# Patient Record
Sex: MALE | Race: WHITE | NOT HISPANIC OR LATINO | Employment: OTHER | URBAN - METROPOLITAN AREA
[De-identification: names, ages, dates, MRNs, and addresses within clinical notes are randomized per-mention and may not be internally consistent; named-entity substitution may affect disease eponyms.]

---

## 2018-02-27 RX ORDER — SIMVASTATIN 40 MG
40 TABLET ORAL
COMMUNITY
End: 2018-05-23 | Stop reason: CLARIF

## 2018-02-27 RX ORDER — METOPROLOL TARTRATE 50 MG/1
75 TABLET, FILM COATED ORAL EVERY 12 HOURS SCHEDULED
COMMUNITY

## 2018-02-27 RX ORDER — ASPIRIN 81 MG/1
81 TABLET ORAL DAILY
COMMUNITY

## 2018-02-27 RX ORDER — OMEGA-3 FATTY ACIDS CAP DELAYED RELEASE 1000 MG 1000 MG
1 CAPSULE DELAYED RELEASE ORAL DAILY
COMMUNITY

## 2018-02-27 RX ORDER — QUINAPRIL 20 MG/1
20 TABLET ORAL DAILY
COMMUNITY

## 2018-02-27 NOTE — PRE-PROCEDURE INSTRUCTIONS
Pre-Surgery Instructions:   Medication Instructions    AMLODIPINE BESYLATE PO Instructed patient per Anesthesia Guidelines   aspirin (ECOTRIN LOW STRENGTH) 81 mg EC tablet Instructed patient per Anesthesia Guidelines   Calcium Carbonate-Vitamin D (CALCIUM 600+D PO) Instructed patient per Anesthesia Guidelines   metoprolol tartrate (LOPRESSOR) 50 mg tablet Instructed patient per Anesthesia Guidelines   Multiple Vitamins-Iron (MULTIVITAMIN/IRON PO) Instructed patient per Anesthesia Guidelines   Omega-3 Fatty Acids (FISH OIL) 1000 MG CPDR Instructed patient per Anesthesia Guidelines   quinapril (ACCUPRIL) 20 mg tablet Instructed patient per Anesthesia Guidelines   simvastatin (ZOCOR) 40 mg tablet Instructed patient per Anesthesia Guidelines     I  Pre op instructions reviewed; verbalized understanding

## 2018-03-02 ENCOUNTER — ANESTHESIA (OUTPATIENT)
Dept: PERIOP | Facility: HOSPITAL | Age: 73
End: 2018-03-02
Payer: MEDICARE

## 2018-03-02 ENCOUNTER — HOSPITAL ENCOUNTER (OUTPATIENT)
Facility: HOSPITAL | Age: 73
Setting detail: OUTPATIENT SURGERY
Discharge: HOME | End: 2018-03-02
Attending: SURGERY | Admitting: SURGERY
Payer: MEDICARE

## 2018-03-02 ENCOUNTER — ANESTHESIA EVENT (OUTPATIENT)
Dept: PERIOP | Facility: HOSPITAL | Age: 73
End: 2018-03-02
Payer: MEDICARE

## 2018-03-02 VITALS
HEIGHT: 69 IN | OXYGEN SATURATION: 94 % | TEMPERATURE: 98.4 F | SYSTOLIC BLOOD PRESSURE: 122 MMHG | WEIGHT: 195 LBS | RESPIRATION RATE: 18 BRPM | DIASTOLIC BLOOD PRESSURE: 74 MMHG | HEART RATE: 71 BPM | BODY MASS INDEX: 28.88 KG/M2

## 2018-03-02 PROCEDURE — C1781 MESH (IMPLANTABLE): HCPCS | Performed by: SURGERY

## 2018-03-02 DEVICE — BARD MODIFIED KUGEL HERNIA PATCH
Type: IMPLANTABLE DEVICE | Site: ABDOMEN | Status: FUNCTIONAL
Brand: BARD MODIFIED KUGEL HERNIA PATCH

## 2018-03-02 RX ORDER — ALBUMIN, HUMAN INJ 5% 5 %
SOLUTION INTRAVENOUS CONTINUOUS PRN
Status: DISCONTINUED | OUTPATIENT
Start: 2018-03-02 | End: 2018-03-02

## 2018-03-02 RX ORDER — FENTANYL CITRATE/PF 50 MCG/ML
25 SYRINGE (ML) INJECTION
Status: DISCONTINUED | OUTPATIENT
Start: 2018-03-02 | End: 2018-03-02 | Stop reason: HOSPADM

## 2018-03-02 RX ORDER — MIDAZOLAM HYDROCHLORIDE 1 MG/ML
INJECTION INTRAMUSCULAR; INTRAVENOUS AS NEEDED
Status: DISCONTINUED | OUTPATIENT
Start: 2018-03-02 | End: 2018-03-02 | Stop reason: SURG

## 2018-03-02 RX ORDER — CLINDAMYCIN PHOSPHATE 900 MG/50ML
900 INJECTION INTRAVENOUS ONCE
Status: DISCONTINUED | OUTPATIENT
Start: 2018-03-02 | End: 2018-03-02 | Stop reason: HOSPADM

## 2018-03-02 RX ORDER — OXYCODONE HYDROCHLORIDE AND ACETAMINOPHEN 5; 325 MG/1; MG/1
1 TABLET ORAL EVERY 4 HOURS PRN
Status: DISCONTINUED | OUTPATIENT
Start: 2018-03-02 | End: 2018-03-02 | Stop reason: HOSPADM

## 2018-03-02 RX ORDER — METOCLOPRAMIDE HYDROCHLORIDE 5 MG/ML
10 INJECTION INTRAMUSCULAR; INTRAVENOUS ONCE AS NEEDED
Status: DISCONTINUED | OUTPATIENT
Start: 2018-03-02 | End: 2018-03-02 | Stop reason: HOSPADM

## 2018-03-02 RX ORDER — ONDANSETRON 2 MG/ML
4 INJECTION INTRAMUSCULAR; INTRAVENOUS ONCE AS NEEDED
Status: DISCONTINUED | OUTPATIENT
Start: 2018-03-02 | End: 2018-03-02 | Stop reason: HOSPADM

## 2018-03-02 RX ORDER — GLYCOPYRROLATE 0.2 MG/ML
INJECTION INTRAMUSCULAR; INTRAVENOUS AS NEEDED
Status: DISCONTINUED | OUTPATIENT
Start: 2018-03-02 | End: 2018-03-02 | Stop reason: SURG

## 2018-03-02 RX ORDER — BUPIVACAINE HYDROCHLORIDE AND EPINEPHRINE 2.5; 5 MG/ML; UG/ML
INJECTION, SOLUTION EPIDURAL; INFILTRATION; INTRACAUDAL; PERINEURAL AS NEEDED
Status: DISCONTINUED | OUTPATIENT
Start: 2018-03-02 | End: 2018-03-02 | Stop reason: HOSPADM

## 2018-03-02 RX ORDER — SODIUM CHLORIDE, SODIUM LACTATE, POTASSIUM CHLORIDE, CALCIUM CHLORIDE 600; 310; 30; 20 MG/100ML; MG/100ML; MG/100ML; MG/100ML
20 INJECTION, SOLUTION INTRAVENOUS CONTINUOUS
Status: DISCONTINUED | OUTPATIENT
Start: 2018-03-02 | End: 2018-03-02 | Stop reason: HOSPADM

## 2018-03-02 RX ORDER — PROPOFOL 10 MG/ML
INJECTION, EMULSION INTRAVENOUS AS NEEDED
Status: DISCONTINUED | OUTPATIENT
Start: 2018-03-02 | End: 2018-03-02 | Stop reason: SURG

## 2018-03-02 RX ORDER — ONDANSETRON 2 MG/ML
4 INJECTION INTRAMUSCULAR; INTRAVENOUS EVERY 4 HOURS PRN
Status: DISCONTINUED | OUTPATIENT
Start: 2018-03-02 | End: 2018-03-02 | Stop reason: HOSPADM

## 2018-03-02 RX ORDER — FENTANYL CITRATE 50 UG/ML
INJECTION, SOLUTION INTRAMUSCULAR; INTRAVENOUS AS NEEDED
Status: DISCONTINUED | OUTPATIENT
Start: 2018-03-02 | End: 2018-03-02 | Stop reason: SURG

## 2018-03-02 RX ORDER — ONDANSETRON 2 MG/ML
INJECTION INTRAMUSCULAR; INTRAVENOUS AS NEEDED
Status: DISCONTINUED | OUTPATIENT
Start: 2018-03-02 | End: 2018-03-02 | Stop reason: SURG

## 2018-03-02 RX ORDER — LIDOCAINE HYDROCHLORIDE 10 MG/ML
INJECTION, SOLUTION INFILTRATION; PERINEURAL AS NEEDED
Status: DISCONTINUED | OUTPATIENT
Start: 2018-03-02 | End: 2018-03-02 | Stop reason: SURG

## 2018-03-02 RX ORDER — ROCURONIUM BROMIDE 10 MG/ML
INJECTION, SOLUTION INTRAVENOUS AS NEEDED
Status: DISCONTINUED | OUTPATIENT
Start: 2018-03-02 | End: 2018-03-02 | Stop reason: SURG

## 2018-03-02 RX ORDER — CLINDAMYCIN PHOSPHATE 150 MG/ML
INJECTION, SOLUTION INTRAVENOUS AS NEEDED
Status: DISCONTINUED | OUTPATIENT
Start: 2018-03-02 | End: 2018-03-02 | Stop reason: SURG

## 2018-03-02 RX ADMIN — PROPOFOL 160 MG: 10 INJECTION, EMULSION INTRAVENOUS at 10:15

## 2018-03-02 RX ADMIN — ROCURONIUM BROMIDE 50 MG: 10 INJECTION INTRAVENOUS at 10:15

## 2018-03-02 RX ADMIN — ONDANSETRON 4 MG: 2 INJECTION INTRAMUSCULAR; INTRAVENOUS at 10:22

## 2018-03-02 RX ADMIN — SODIUM CHLORIDE, SODIUM LACTATE, POTASSIUM CHLORIDE, AND CALCIUM CHLORIDE 20 ML/HR: .6; .31; .03; .02 INJECTION, SOLUTION INTRAVENOUS at 11:30

## 2018-03-02 RX ADMIN — SODIUM CHLORIDE, SODIUM LACTATE, POTASSIUM CHLORIDE, AND CALCIUM CHLORIDE: .6; .31; .03; .02 INJECTION, SOLUTION INTRAVENOUS at 08:37

## 2018-03-02 RX ADMIN — FENTANYL CITRATE 50 MCG: 50 INJECTION, SOLUTION INTRAMUSCULAR; INTRAVENOUS at 10:15

## 2018-03-02 RX ADMIN — LIDOCAINE HYDROCHLORIDE 100 MG: 10 INJECTION, SOLUTION INFILTRATION; PERINEURAL at 10:15

## 2018-03-02 RX ADMIN — CLINDAMYCIN PHOSPHATE 900 MG: 150 INJECTION, SOLUTION INTRAMUSCULAR; INTRAVENOUS at 10:15

## 2018-03-02 RX ADMIN — FENTANYL CITRATE 25 MCG: 50 INJECTION, SOLUTION INTRAMUSCULAR; INTRAVENOUS at 11:35

## 2018-03-02 RX ADMIN — FENTANYL CITRATE 25 MCG: 50 INJECTION, SOLUTION INTRAMUSCULAR; INTRAVENOUS at 11:28

## 2018-03-02 RX ADMIN — SODIUM CHLORIDE, SODIUM LACTATE, POTASSIUM CHLORIDE, AND CALCIUM CHLORIDE 20 ML/HR: .6; .31; .03; .02 INJECTION, SOLUTION INTRAVENOUS at 08:36

## 2018-03-02 RX ADMIN — FENTANYL CITRATE 25 MCG: 50 INJECTION, SOLUTION INTRAMUSCULAR; INTRAVENOUS at 11:23

## 2018-03-02 RX ADMIN — ONDANSETRON 4 MG: 2 INJECTION INTRAMUSCULAR; INTRAVENOUS at 10:55

## 2018-03-02 RX ADMIN — GLYCOPYRROLATE 0.8 MG: 0.2 INJECTION, SOLUTION INTRAMUSCULAR; INTRAVENOUS at 10:55

## 2018-03-02 RX ADMIN — NEOSTIGMINE METHYLSULFATE 5 MG: 1 INJECTION, SOLUTION INTRAMUSCULAR; INTRAVENOUS; SUBCUTANEOUS at 10:55

## 2018-03-02 RX ADMIN — FENTANYL CITRATE 50 MCG: 50 INJECTION, SOLUTION INTRAMUSCULAR; INTRAVENOUS at 11:02

## 2018-03-02 RX ADMIN — FENTANYL CITRATE 25 MCG: 50 INJECTION, SOLUTION INTRAMUSCULAR; INTRAVENOUS at 11:18

## 2018-03-02 RX ADMIN — MIDAZOLAM HYDROCHLORIDE 2 MG: 1 INJECTION, SOLUTION INTRAMUSCULAR; INTRAVENOUS at 10:05

## 2018-03-02 NOTE — DISCHARGE INSTRUCTIONS
Diet and activity as tolerated  May shower  May drive when not taking pain medication  Please consider milk of magnesia daily in order to help prevent constipation  Please call [phone] for a follow-up office visit for 2 weeks

## 2018-03-02 NOTE — ANESTHESIA POSTPROCEDURE EVALUATION
Post-Op Assessment Note      CV Status:  Stable    Mental Status:  Awake    Hydration Status:  Stable    PONV Controlled:  None    Airway Patency:  Patent    Post Op Vitals Reviewed: Yes          Staff: CRNA       Comments: Pt  to Pacu  Report given  Course uneventful  VSS  Airway patent            /88 (03/02/18 1112)    Temp      Pulse 85 (03/02/18 1112)   Resp 16 (03/02/18 1112)    SpO2 98 % (03/02/18 1112)

## 2018-03-02 NOTE — ANESTHESIA PREPROCEDURE EVALUATION
Review of Systems/Medical History  Patient summary reviewed  Chart reviewed      Cardiovascular  EKG reviewed, Hyperlipidemia, Hypertension , Past MI , History of CABG,    Pulmonary  Negative pulmonary ROS        GI/Hepatic      Comment: Hernia, hx colon cancer     Negative  ROS        Endo/Other  Negative endo/other ROS      GYN  Negative gynecology ROS          Hematology  Negative hematology ROS      Musculoskeletal  Negative musculoskeletal ROS        Neurology  Negative neurology ROS      Psychology   Negative psychology ROS              Physical Exam    Airway    Mallampati score: II  TM Distance: >3 FB  Neck ROM: full     Dental   No notable dental hx upper dentures and lower dentures,     Cardiovascular  Rhythm: regular, Rate: normal, Cardiovascular exam normal    Pulmonary  Pulmonary exam normal Breath sounds clear to auscultation,     Other Findings        Anesthesia Plan  ASA Score- 3     Anesthesia Type- general with ASA Monitors  Additional Monitors:   Airway Plan: ETT  Comment: I, Dr Lachelle Liriano, the attending physician, have personally seen and evaluated the patient prior to anesthetic care  I have reviewed the pre-anesthetic record, and other medical records if appropriate to the anesthetic care  If a CRNA is involved in the case, I have reviewed the CRNA assessment, if present, and agree  The patient is in a suitable condition to proceed with my formulated anesthetic plan        Plan Factors-    Induction- intravenous  Postoperative Plan-     Informed Consent- Anesthetic plan and risks discussed with patient  I personally reviewed this patient with the CRNA  Discussed and agreed on the Anesthesia Plan with the CRNA  Sesar Ibarra

## 2018-03-02 NOTE — OP NOTE
OPERATIVE REPORT  PATIENT NAME: Noelle Reason    :  1945  MRN: [id number]  Pt Location: [facility] OR ROOM 07    SURGERY DATE: 3/2/2018    Surgeon(s) and Role:     Staci Logan MD - Primary    Preop Diagnosis:  Ventral hernia [K43 9]    Post-Op Diagnosis Codes: * Ventral hernia [K43 9]    Procedure(s) (LRB):  REPAIR HERNIA VENTRAL WITH MESH (N/A)    Specimen(s):  * No specimens in log *    Estimated Blood Loss:   0 mL    Drains:       Anesthesia Type:   General    Operative Indications:  Ventral hernia [K43 9]      Operative Findings:  Independent, nonsmoker, ASA 2, wound class 1, BMI 29, weight 195, height 69 inch        Cardiovascular  EKG reviewed, Hyperlipidemia, Hypertension , Past MI , History of CABG,  Pulmonary  Negative pulmonary ROS       GI/Hepatic     Comment: Hernia, hx colon cancer      Negative  ROS       Endo/Other  Negative endo/other ROS     GYN  Negative gynecology ROS          Hematology  Negative hematology ROS     Musculoskeletal  Negative musculoskeletal ROS       Neurology  Negative neurology ROS     Psychology   Negative psychology ROS                 Complications:   None    Procedure and Technique:  Patient was identified visually and via armband  Placed in a supine position  After general anesthesia the abdomen was prepped and draped in a sterile fashion  0 25% Marcaine with epinephrine was utilized throughout the procedure  Incision was made the upper midline abdomen carried down through skin subcutaneous tissue  Incisional hernia was present  The hernia sac was dissected free and retracted  Omentum was interposed and this region  A large diastasis was also present  A 4 inch x 4 5 inch polypropylene mesh was then introduced  This was sewn in place with interrupted figure-of-eight 1  Vicryl suture  A by 2 0 Vicryl subcutaneous and 4 Monocryl sutures  Dermabond was applied    The patient was awake from anesthesia transferred to the recovery room stable condition  Sponge and instrument count correct     I was present for the entire procedure    Patient Disposition:  PACU     SIGNATURE: Marlene Grigsby MD  DATE: March 2, 2018  TIME: 11:55 AM

## 2018-04-19 ENCOUNTER — APPOINTMENT (EMERGENCY)
Dept: RADIOLOGY | Facility: HOSPITAL | Age: 73
DRG: 546 | End: 2018-04-19
Payer: MEDICARE

## 2018-04-19 ENCOUNTER — HOSPITAL ENCOUNTER (INPATIENT)
Facility: HOSPITAL | Age: 73
LOS: 1 days | Discharge: HOME | DRG: 546 | End: 2018-04-21
Attending: EMERGENCY MEDICINE | Admitting: INTERNAL MEDICINE
Payer: MEDICARE

## 2018-04-19 DIAGNOSIS — R77.8 ELEVATED TROPONIN: ICD-10-CM

## 2018-04-19 DIAGNOSIS — R42 DIZZINESS: ICD-10-CM

## 2018-04-19 DIAGNOSIS — R07.9 CHEST PAIN: Primary | ICD-10-CM

## 2018-04-19 PROBLEM — Z95.1 HX OF CABG: Status: ACTIVE | Noted: 2018-04-19

## 2018-04-19 PROBLEM — E78.5 HLD (HYPERLIPIDEMIA): Status: ACTIVE | Noted: 2018-04-19

## 2018-04-19 PROBLEM — H81.13 BENIGN PAROXYSMAL POSITIONAL VERTIGO DUE TO BILATERAL VESTIBULAR DISORDER: Status: ACTIVE | Noted: 2018-04-19

## 2018-04-19 PROBLEM — R07.2 PRECORDIAL PAIN: Status: ACTIVE | Noted: 2018-04-19

## 2018-04-19 PROBLEM — I10 ESSENTIAL HYPERTENSION: Status: ACTIVE | Noted: 2018-04-19

## 2018-04-19 LAB
ALBUMIN SERPL BCP-MCNC: 3.5 G/DL (ref 3.5–5)
ALP SERPL-CCNC: 66 U/L (ref 46–116)
ALT SERPL W P-5'-P-CCNC: 49 U/L (ref 12–78)
ANION GAP SERPL CALCULATED.3IONS-SCNC: 8 MMOL/L (ref 4–13)
AST SERPL W P-5'-P-CCNC: 59 U/L (ref 5–45)
ATRIAL RATE: 192 BPM
ATRIAL RATE: 66 BPM
ATRIAL RATE: 69 BPM
BASOPHILS # BLD AUTO: 0.02 THOUSANDS/ΜL (ref 0–0.1)
BASOPHILS NFR BLD AUTO: 0 % (ref 0–1)
BILIRUB SERPL-MCNC: 0.84 MG/DL (ref 0.2–1)
BUN SERPL-MCNC: 9 MG/DL (ref 5–25)
CALCIUM SERPL-MCNC: 9 MG/DL (ref 8.3–10.1)
CHLORIDE SERPL-SCNC: 107 MMOL/L (ref 100–108)
CO2 SERPL-SCNC: 24 MMOL/L (ref 21–32)
CREAT SERPL-MCNC: 0.84 MG/DL (ref 0.6–1.3)
EOSINOPHIL # BLD AUTO: 0.07 THOUSAND/ΜL (ref 0–0.61)
EOSINOPHIL NFR BLD AUTO: 2 % (ref 0–6)
ERYTHROCYTE [DISTWIDTH] IN BLOOD BY AUTOMATED COUNT: 13.5 % (ref 11.6–15.1)
GFR SERPL CREATININE-BSD FRML MDRD: 88 ML/MIN/1.73SQ M
GLUCOSE SERPL-MCNC: 159 MG/DL (ref 65–140)
HCT VFR BLD AUTO: 42 % (ref 36.5–49.3)
HGB BLD-MCNC: 14.3 G/DL (ref 12–17)
LYMPHOCYTES # BLD AUTO: 1.23 THOUSANDS/ΜL (ref 0.6–4.47)
LYMPHOCYTES NFR BLD AUTO: 27 % (ref 14–44)
MAGNESIUM SERPL-MCNC: 2.1 MG/DL (ref 1.6–2.6)
MCH RBC QN AUTO: 35.2 PG (ref 26.8–34.3)
MCHC RBC AUTO-ENTMCNC: 34 G/DL (ref 31.4–37.4)
MCV RBC AUTO: 103 FL (ref 82–98)
MONOCYTES # BLD AUTO: 0.45 THOUSAND/ΜL (ref 0.17–1.22)
MONOCYTES NFR BLD AUTO: 10 % (ref 4–12)
NEUTROPHILS # BLD AUTO: 2.83 THOUSANDS/ΜL (ref 1.85–7.62)
NEUTS SEG NFR BLD AUTO: 61 % (ref 43–75)
NRBC BLD AUTO-RTO: 0 /100 WBCS
NT-PROBNP SERPL-MCNC: 155 PG/ML
P AXIS: 52 DEGREES
P AXIS: 54 DEGREES
PLATELET # BLD AUTO: 152 THOUSANDS/UL (ref 149–390)
PLATELET # BLD AUTO: 172 THOUSANDS/UL (ref 149–390)
PMV BLD AUTO: 10.1 FL (ref 8.9–12.7)
PMV BLD AUTO: 9.7 FL (ref 8.9–12.7)
POTASSIUM SERPL-SCNC: 5 MMOL/L (ref 3.5–5.3)
PR INTERVAL: 174 MS
PR INTERVAL: 194 MS
PROT SERPL-MCNC: 7.9 G/DL (ref 6.4–8.2)
QRS AXIS: -57 DEGREES
QRS AXIS: -64 DEGREES
QRS AXIS: -65 DEGREES
QRSD INTERVAL: 120 MS
QRSD INTERVAL: 126 MS
QRSD INTERVAL: 142 MS
QT INTERVAL: 442 MS
QT INTERVAL: 442 MS
QT INTERVAL: 462 MS
QTC INTERVAL: 463 MS
QTC INTERVAL: 473 MS
QTC INTERVAL: 484 MS
RBC # BLD AUTO: 4.06 MILLION/UL (ref 3.88–5.62)
SODIUM SERPL-SCNC: 139 MMOL/L (ref 136–145)
T WAVE AXIS: -5 DEGREES
T WAVE AXIS: 19 DEGREES
T WAVE AXIS: 4 DEGREES
TROPONIN I SERPL-MCNC: 0.32 NG/ML
TROPONIN I SERPL-MCNC: 0.33 NG/ML
TROPONIN I SERPL-MCNC: <0.02 NG/ML
VENTRICULAR RATE: 66 BPM
VENTRICULAR RATE: 66 BPM
VENTRICULAR RATE: 69 BPM
WBC # BLD AUTO: 4.61 THOUSAND/UL (ref 4.31–10.16)

## 2018-04-19 PROCEDURE — 85025 COMPLETE CBC W/AUTO DIFF WBC: CPT

## 2018-04-19 PROCEDURE — 80053 COMPREHEN METABOLIC PANEL: CPT

## 2018-04-19 PROCEDURE — 71046 X-RAY EXAM CHEST 2 VIEWS: CPT

## 2018-04-19 PROCEDURE — 36415 COLL VENOUS BLD VENIPUNCTURE: CPT

## 2018-04-19 PROCEDURE — 93010 ELECTROCARDIOGRAM REPORT: CPT | Performed by: INTERNAL MEDICINE

## 2018-04-19 PROCEDURE — 96360 HYDRATION IV INFUSION INIT: CPT

## 2018-04-19 PROCEDURE — 99285 EMERGENCY DEPT VISIT HI MDM: CPT

## 2018-04-19 PROCEDURE — 70450 CT HEAD/BRAIN W/O DYE: CPT

## 2018-04-19 PROCEDURE — 85049 AUTOMATED PLATELET COUNT: CPT | Performed by: INTERNAL MEDICINE

## 2018-04-19 PROCEDURE — 84484 ASSAY OF TROPONIN QUANT: CPT

## 2018-04-19 PROCEDURE — 83735 ASSAY OF MAGNESIUM: CPT | Performed by: PHYSICIAN ASSISTANT

## 2018-04-19 PROCEDURE — 83880 ASSAY OF NATRIURETIC PEPTIDE: CPT | Performed by: PHYSICIAN ASSISTANT

## 2018-04-19 PROCEDURE — 84484 ASSAY OF TROPONIN QUANT: CPT | Performed by: INTERNAL MEDICINE

## 2018-04-19 PROCEDURE — 93005 ELECTROCARDIOGRAM TRACING: CPT

## 2018-04-19 PROCEDURE — 99220 PR INITIAL OBSERVATION CARE/DAY 70 MINUTES: CPT | Performed by: INTERNAL MEDICINE

## 2018-04-19 RX ORDER — ONDANSETRON 2 MG/ML
4 INJECTION INTRAMUSCULAR; INTRAVENOUS EVERY 6 HOURS PRN
Status: DISCONTINUED | OUTPATIENT
Start: 2018-04-19 | End: 2018-04-21 | Stop reason: HOSPADM

## 2018-04-19 RX ORDER — ACETAMINOPHEN 325 MG/1
650 TABLET ORAL EVERY 4 HOURS PRN
Status: DISCONTINUED | OUTPATIENT
Start: 2018-04-19 | End: 2018-04-21 | Stop reason: HOSPADM

## 2018-04-19 RX ORDER — MECLIZINE HYDROCHLORIDE 25 MG/1
25 TABLET ORAL EVERY 8 HOURS SCHEDULED
Status: DISCONTINUED | OUTPATIENT
Start: 2018-04-19 | End: 2018-04-21 | Stop reason: HOSPADM

## 2018-04-19 RX ORDER — AMLODIPINE BESYLATE 5 MG/1
5 TABLET ORAL DAILY
Status: DISCONTINUED | OUTPATIENT
Start: 2018-04-20 | End: 2018-04-21 | Stop reason: HOSPADM

## 2018-04-19 RX ORDER — DIAZEPAM 5 MG/ML
2 INJECTION, SOLUTION INTRAMUSCULAR; INTRAVENOUS ONCE
Status: COMPLETED | OUTPATIENT
Start: 2018-04-19 | End: 2018-04-19

## 2018-04-19 RX ORDER — DIAZEPAM 5 MG/ML
5 INJECTION, SOLUTION INTRAMUSCULAR; INTRAVENOUS
Status: DISCONTINUED | OUTPATIENT
Start: 2018-04-19 | End: 2018-04-21 | Stop reason: HOSPADM

## 2018-04-19 RX ORDER — PRAVASTATIN SODIUM 40 MG
40 TABLET ORAL
Status: DISCONTINUED | OUTPATIENT
Start: 2018-04-19 | End: 2018-04-21 | Stop reason: HOSPADM

## 2018-04-19 RX ORDER — NITROGLYCERIN 0.4 MG/1
0.4 TABLET SUBLINGUAL ONCE
Status: DISCONTINUED | OUTPATIENT
Start: 2018-04-19 | End: 2018-04-19

## 2018-04-19 RX ORDER — NITROGLYCERIN 0.4 MG/1
0.4 TABLET SUBLINGUAL ONCE
Status: COMPLETED | OUTPATIENT
Start: 2018-04-19 | End: 2018-04-19

## 2018-04-19 RX ORDER — ASPIRIN 81 MG/1
81 TABLET ORAL DAILY
Status: DISCONTINUED | OUTPATIENT
Start: 2018-04-20 | End: 2018-04-21 | Stop reason: HOSPADM

## 2018-04-19 RX ORDER — MECLIZINE HYDROCHLORIDE 25 MG/1
25 TABLET ORAL ONCE
Status: COMPLETED | OUTPATIENT
Start: 2018-04-19 | End: 2018-04-19

## 2018-04-19 RX ORDER — MAGNESIUM HYDROXIDE/ALUMINUM HYDROXICE/SIMETHICONE 120; 1200; 1200 MG/30ML; MG/30ML; MG/30ML
30 SUSPENSION ORAL EVERY 6 HOURS PRN
Status: DISCONTINUED | OUTPATIENT
Start: 2018-04-19 | End: 2018-04-21 | Stop reason: HOSPADM

## 2018-04-19 RX ORDER — QUINAPRIL 20 MG/1
20 TABLET ORAL DAILY
Status: DISCONTINUED | OUTPATIENT
Start: 2018-04-20 | End: 2018-04-21 | Stop reason: HOSPADM

## 2018-04-19 RX ADMIN — PRAVASTATIN SODIUM 40 MG: 40 TABLET ORAL at 18:35

## 2018-04-19 RX ADMIN — NITROGLYCERIN 0.4 MG: 0.4 TABLET SUBLINGUAL at 07:54

## 2018-04-19 RX ADMIN — MECLIZINE HYDROCHLORIDE 25 MG: 25 TABLET ORAL at 14:11

## 2018-04-19 RX ADMIN — MECLIZINE HYDROCHLORIDE 25 MG: 25 TABLET ORAL at 07:46

## 2018-04-19 RX ADMIN — SODIUM CHLORIDE 1000 ML: 0.9 INJECTION, SOLUTION INTRAVENOUS at 07:22

## 2018-04-19 RX ADMIN — MECLIZINE HYDROCHLORIDE 25 MG: 25 TABLET ORAL at 21:15

## 2018-04-19 RX ADMIN — METOPROLOL TARTRATE 75 MG: 50 TABLET ORAL at 21:16

## 2018-04-19 RX ADMIN — DIAZEPAM 2 MG: 5 INJECTION, SOLUTION INTRAMUSCULAR; INTRAVENOUS at 10:15

## 2018-04-19 RX ADMIN — NITROGLYCERIN 1 INCH: 20 OINTMENT TOPICAL at 08:04

## 2018-04-19 RX ADMIN — DIAZEPAM 5 MG: 5 INJECTION, SOLUTION INTRAMUSCULAR; INTRAVENOUS at 21:15

## 2018-04-19 NOTE — H&P
H&P- Amor Zavala 1945, 72 y o  male MRN: [id number]    Unit/Bed#: ED 19 Encounter: [id number]    Primary Care Provider: Wells Sacks, DO   Date and time admitted to hospital: 4/19/2018  6:58 AM        * Benign paroxysmal positional vertigo due to bilateral vestibular disorder   Assessment & Plan    Likely BPPV- given history of vascular disease/chronic lacunar infarctions rule out posterior circulation stroke    Supportive care  Check MRI brain   Meclizine  Hydration  Outpatient vestibular therapy  Continue aspirin/statin        Precordial pain   Assessment & Plan    Currently resolved    Reports that he had stress test in November 2017-was told it was normal  Follows with cardiologist at [facility]  Trend troponins x3  Check fasting lipids        Essential hypertension   Assessment & Plan    Resume outpatient medications and monitor        Hx of CABG   Assessment & Plan    History of CABG 2000  Outpatient Cardiology follow-up        HLD (hyperlipidemia)   Assessment & Plan    Continue statin and check fasting lipids          VTE Prophylaxis: Enoxaparin (Lovenox)  / sequential compression device   Code Status:  Full code  POLST: There is no POLST form on file for this patient (pre-hospital)  Discussion with family:     Anticipated Length of Stay:  Patient will be admitted on an Observation basis with an anticipated length of stay of  < 2 midnights  Justification for Hospital Stay:  Vertigo    Total Time for Visit, including Counseling / Coordination of Care: 70 minutes  Greater than 50% of this total time spent on direct patient counseling and coordination of care  Chief Complaint:   Room spinning    History of Present Illness:    Amor Zavala is a 72 y o  male who presents symptoms of vertigo  Patient reports that he woke up around 2:00 a m , noticed that the whole room was spinning, he felt very dizzy and lightheaded  He was unsteady on feet    She felt nauseous and was retching  Subsequently developed chest pain on the left  Called EMS who gave him nitroglycerin and aspirin  No prior history of vertigo  No headache or double vision  No focal weakness  Chest pain currently resolved  Denies any exertional chest pain  No shortness of breath or palpitations  Review of Systems:    Review of Systems  Twelve point review systems negative except above  Past Medical and Surgical History:     Past Medical History:   Diagnosis Date    Cancer ([facility] [address] )     colon    Hyperlipidemia     Hypertension     Myocardial infarction ([facility] [address] ) 2000    Ventral hernia        Past Surgical History:   Procedure Laterality Date    COLON SURGERY      colon resection    CORONARY ARTERY BYPASS GRAFT      x5    PA REPAIR INCISIONAL HERNIA,REDUCIBLE N/A 3/2/2018    Procedure: REPAIR HERNIA VENTRAL WITH MESH;  Surgeon: Keena Flynn MD;  Location: [facility];  Service: General       Meds/Allergies:    Prior to Admission medications    Medication Sig Start Date End Date Taking? Authorizing Provider   AMLODIPINE BESYLATE PO Take 5 mg by mouth daily   Yes Historical Provider, MD   aspirin (ECOTRIN LOW STRENGTH) 81 mg EC tablet Take 81 mg by mouth daily   Yes Historical Provider, MD   Calcium Carbonate-Vitamin D (CALCIUM 600+D PO) Take 1 tablet by mouth daily   Yes Historical Provider, MD   metoprolol tartrate (LOPRESSOR) 50 mg tablet Take 75 mg by mouth every 12 (twelve) hours   Yes Historical Provider, MD   Multiple Vitamins-Iron (MULTIVITAMIN/IRON PO) Take 1 tablet by mouth daily   Yes Historical Provider, MD   Omega-3 Fatty Acids (FISH OIL) 1000 MG CPDR Take 1 capsule by mouth daily   Yes Historical Provider, MD   quinapril (ACCUPRIL) 20 mg tablet Take 20 mg by mouth daily   Yes Historical Provider, MD   simvastatin (ZOCOR) 40 mg tablet Take 40 mg by mouth daily at bedtime   Yes Historical Provider, MD     I have reviewed home medications with patient personally  Allergies:    Allergies Allergen Reactions    Penicillins Shortness Of Breath and Rash       Social History:     Marital Status: /Civil Union   Occupation:  Retired  Patient Pre-hospital Living Situation:  Lives with wife  Patient Pre-hospital Level of Mobility:  Normal independent  Patient Pre-hospital Diet Restrictions:  None  Substance Use History:   History   Alcohol Use    7 2 oz/week    12 Cans of beer per week     History   Smoking Status    Former Smoker    Quit date: 1986   Smokeless Tobacco    Never Used     History   Drug use: Unknown       Family History:    non-contributory    Physical Exam:     Vitals:   Blood Pressure: 124/62 (04/19/18 1015)  Pulse: 62 (04/19/18 1015)  Temperature: 97 5 °F (36 4 °C) (04/19/18 0733)  Temp Source: Oral (04/19/18 0733)  Respirations: 18 (04/19/18 0733)  Height: 5' 9" (175 3 cm) (04/19/18 0733)  Weight - Scale: 88 5 kg (195 lb) (04/19/18 0733)  SpO2: 97 % (04/19/18 1015)    Physical Exam     Gen -Patient comfortable in bed  Neck- Supple  No thyromegaly or lymphadenopathy  Lungs-Clear bilaterally without any wheeze or rales   Heart S1-S2, regular rate and rhythm, no murmurs  Abdomen-soft nontender, no organomegaly  Bowel sounds present  Extremities-no cyanosi,  clubbing or edema  Skin- no rash  Neuro-no nystagmus, no gross deficits noted  Symptoms reproduced on turning head to the left or right         Additional Data:     Lab Results: I have personally reviewed pertinent reports          Results from last 7 days  Lab Units 04/19/18  0720   WBC Thousand/uL 4 61   HEMOGLOBIN g/dL 14 3   HEMATOCRIT % 42 0   PLATELETS Thousands/uL 152   NEUTROS PCT % 61   LYMPHS PCT % 27   MONOS PCT % 10   EOS PCT % 2       Results from last 7 days  Lab Units 04/19/18  0720   SODIUM mmol/L 139   POTASSIUM mmol/L 5 0   CHLORIDE mmol/L 107   CO2 mmol/L 24   BUN mg/dL 9   CREATININE mg/dL 0 84   CALCIUM mg/dL 9 0   TOTAL PROTEIN g/dL 7 9   BILIRUBIN TOTAL mg/dL 0 84   ALK PHOS U/L 66   ALT U/L 49   AST U/L 59*   GLUCOSE RANDOM mg/dL 159*           Imaging: I have personally reviewed pertinent reports  XR chest 2 views   ED Interpretation by Bianca Taylor PA-C (04/19 4749)   Enlarged heart similar to previous no acute finding      Final Result by Sai Daily MD (04/19 2498)      No acute cardiopulmonary disease  Cardiomegaly  Findings concur with the preliminary report by the referring clinician already in PACS and/or our electronic record EPIC  Workstation performed: [id number]         CT head without contrast   Final Result by Marcy Bartholomew DO (04/19 3571)   Cerebral atrophy with chronic small vessel ischemic white matter disease and chronic lacunar infarctions  No acute intracranial abnormality  Workstation performed: [id number]             EKG, Pathology, and Other Studies Reviewed on Admission:   · EKG: nsr, right bundle-branch block, left axis deviation    Allscripts / Epic Records Reviewed: Yes     ** Please Note: This note has been constructed using a voice recognition system   **

## 2018-04-19 NOTE — ASSESSMENT & PLAN NOTE
Likely BPPV- given history of vascular disease/chronic lacunar infarctions rule out posterior circulation stroke    Supportive care  Check MRI brain   Meclizine  Hydration  Outpatient vestibular therapy  Continue aspirin/statin

## 2018-04-19 NOTE — ED PROVIDER NOTES
History  Chief Complaint   Patient presents with    Dizziness     pt woke with room spinning- with EMS pt also with chest pain that resolved with ASA and NGT slg, also given zofran IVP and valium IVP     This is a 28-year-old male patient with a history of quintuple bypass  Approximately 0100 hr this morning he states he woke up a followed the room was spinning and he was nauseous  This symptoms fluctuated or made worse when he turned his head to the left  He had some dry heaving but no real vomiting  Prior to calling EMS he started developing substernal chest pressure and some diaphoresis  Upon arrival EMS gave him Valium, Zofran, nitroglycerin, aspirin and the spinning sensation that he was experiencing and the chest pressure and diaphoresis resolved  Currently he states that the room spinning is starting up again  He is no longer nauseous he has no headache no blurred vision or double vision no cough congestion sore throat no fever chills  Currently no chest pain or shortness of breath no nausea vomiting diarrhea abdominal pain no urinary symptoms  No swelling of the limbs  The room spinning dizziness does not get made worse with left turn of the head  He has never had this before  Differential diagnosis includes not limited to ACS, angina, chest discomfort from anxiety of vertigo  Vertigo, posterior CVA less likely, head bleed less likely  Patient does have high probability of admission            Prior to Admission Medications   Prescriptions Last Dose Informant Patient Reported? Taking?    AMLODIPINE BESYLATE PO 4/18/2018 at Unknown time  Yes Yes   Sig: Take 5 mg by mouth daily   Calcium Carbonate-Vitamin D (CALCIUM 600+D PO) 4/18/2018 at Unknown time  Yes Yes   Sig: Take 1 tablet by mouth daily   Multiple Vitamins-Iron (MULTIVITAMIN/IRON PO) 4/18/2018 at Unknown time  Yes Yes   Sig: Take 1 tablet by mouth daily   Omega-3 Fatty Acids (FISH OIL) 1000 MG CPDR 4/18/2018 at Unknown time  Yes Yes Sig: Take 1 capsule by mouth daily   aspirin (ECOTRIN LOW STRENGTH) 81 mg EC tablet 4/19/2018 at Unknown time  Yes Yes   Sig: Take 81 mg by mouth daily   metoprolol tartrate (LOPRESSOR) 50 mg tablet 4/18/2018 at Unknown time  Yes Yes   Sig: Take 75 mg by mouth every 12 (twelve) hours   quinapril (ACCUPRIL) 20 mg tablet 4/18/2018 at Unknown time  Yes Yes   Sig: Take 20 mg by mouth daily   simvastatin (ZOCOR) 40 mg tablet 4/18/2018 at Unknown time  Yes Yes   Sig: Take 40 mg by mouth daily at bedtime      Facility-Administered Medications: None       Past Medical History:   Diagnosis Date    Cancer ([facility] [address] )     colon    Hyperlipidemia     Hypertension     [facility]    Ventral hernia        Past Surgical History:   Procedure Laterality Date    COLON SURGERY      colon resection    CORONARY ARTERY BYPASS GRAFT      x5    CA REPAIR INCISIONAL HERNIA,REDUCIBLE N/A 3/2/2018    Procedure: REPAIR HERNIA VENTRAL WITH MESH;  Surgeon: David Mckee MD;  Location: [facility];  Service: General       History reviewed  No pertinent family history  I have reviewed and agree with the history as documented  Social History   Substance Use Topics    Smoking status: Former Smoker     Quit date: 1986    Smokeless tobacco: Never Used    Alcohol use 7 2 oz/week     12 Cans of beer per week        Review of Systems   Constitutional: Negative for chills  All other systems reviewed and are negative  Physical Exam  ED Triage Vitals [04/19/18 0733]   Temperature Pulse Respirations Blood Pressure SpO2   97 5 °F (36 4 °C) 70 18 137/71 93 %      Temp Source Heart Rate Source Patient Position - Orthostatic VS BP Location FiO2 (%)   Oral -- -- -- --      Pain Score       No Pain           Orthostatic Vital Signs  Vitals:    04/19/18 0733 04/19/18 0800   BP: 137/71 132/73   Pulse: 70 74       Physical Exam   Constitutional: He is oriented to person, place, and time   He appears well-developed and well-nourished  HENT:   Head: Normocephalic and atraumatic  Right Ear: External ear normal    Left Ear: External ear normal    Nose: Nose normal    Mouth/Throat: Oropharynx is clear and moist    Eyes: Conjunctivae are normal  Pupils are equal, round, and reactive to light  Neck: Normal range of motion  Neck supple  Cardiovascular: Normal rate and regular rhythm  Pulmonary/Chest: Effort normal and breath sounds normal    Abdominal: Soft  Bowel sounds are normal  There is no tenderness  Neurological: He is alert and oriented to person, place, and time  He displays normal reflexes  No cranial nerve deficit or sensory deficit  He exhibits normal muscle tone  Coordination normal    Positive Eric-Hallpike to the left   Skin: Skin is warm  Psychiatric: He has a normal mood and affect  His behavior is normal    Nursing note and vitals reviewed        ED Medications  Medications    EMS REPLENISHMENT MED (not administered)   sodium chloride 0 9 % bolus 1,000 mL (1,000 mL Intravenous New Bag 4/19/18 0722)   meclizine (ANTIVERT) tablet 25 mg (25 mg Oral Given 4/19/18 0746)   nitroglycerin (NITROSTAT) SL tablet 0 4 mg (0 4 mg Sublingual Given 4/19/18 0754)   nitroglycerin (NITRO-BID) 2 % TD ointment 1 inch (1 inch Topical Given 4/19/18 0804)       Diagnostic Studies  Results Reviewed     Procedure Component Value Units Date/Time    Comprehensive metabolic panel [[id number]]  (Abnormal) Collected:  04/19/18 0720    Lab Status:  Final result Specimen:  Blood from Arm, Left Updated:  04/19/18 0816     Sodium 139 mmol/L      Potassium 5 0 mmol/L      Chloride 107 mmol/L      CO2 24 mmol/L      Anion Gap 8 mmol/L      BUN 9 mg/dL      Creatinine 0 84 mg/dL      Glucose 159 (H) mg/dL      Calcium 9 0 mg/dL      AST 59 (H) U/L      ALT 49 U/L      Alkaline Phosphatase 66 U/L      Total Protein 7 9 g/dL      Albumin 3 5 g/dL      Total Bilirubin 0 84 mg/dL      eGFR 88 ml/min/1 73sq m     Narrative:         National Kidney Disease Education Program recommendations are as follows:  GFR calculation is accurate only with a steady state creatinine  Chronic Kidney disease less than 60 ml/min/1 73 sq  meters  Kidney failure less than 15 ml/min/1 73 sq  meters  B-type natriuretic peptide [[id number]]  (Abnormal) Collected:  04/19/18 0720    Lab Status:  Final result Specimen:  Blood from Arm, Left Updated:  04/19/18 0816     NT-proBNP 155 (H) pg/mL     Magnesium [[id number]]  (Normal) Collected:  04/19/18 0720    Lab Status:  Final result Specimen:  Blood from Arm, Left Updated:  04/19/18 0816     Magnesium 2 1 mg/dL     CBC and differential [[id number]]  (Abnormal) Collected:  04/19/18 0720    Lab Status:  Final result Specimen:  Blood from Arm, Left Updated:  04/19/18 0756     WBC 4 61 Thousand/uL      RBC 4 06 Million/uL      Hemoglobin 14 3 g/dL      Hematocrit 42 0 %       (H) fL      MCH 35 2 (H) pg      MCHC 34 0 g/dL      RDW 13 5 %      MPV 10 1 fL      Platelets 632 Thousands/uL      nRBC 0 /100 WBCs      Neutrophils Relative 61 %      Lymphocytes Relative 27 %      Monocytes Relative 10 %      Eosinophils Relative 2 %      Basophils Relative 0 %      Neutrophils Absolute 2 83 Thousands/µL      Lymphocytes Absolute 1 23 Thousands/µL      Monocytes Absolute 0 45 Thousand/µL      Eosinophils Absolute 0 07 Thousand/µL      Basophils Absolute 0 02 Thousands/µL     Troponin I [[id number]]  (Normal) Collected:  04/19/18 0720    Lab Status:  Final result Specimen:  Blood from Arm, Left Updated:  04/19/18 0752     Troponin I <0 02 ng/mL     Narrative:         [employer] Chemistry analyzer 99% cutoff is > 0 04 ng/mL in network labs    o cTnI 99% cutoff is useful only when applied to patients in the clinical setting of myocardial ischemia  o cTnI 99% cutoff should be interpreted in the context of clinical history, ECG findings and possibly cardiac imaging to establish correct diagnosis    o cTnI 99% cutoff may be suggestive but clearly not indicative of a coronary event without the clinical setting of myocardial ischemia  XR chest 2 views   ED Interpretation by Bianca Taylor PA-C (04/19 9715)   Enlarged heart similar to previous no acute finding      Final Result by Sai Daily MD (04/19 0896)      No acute cardiopulmonary disease  Cardiomegaly  Findings concur with the preliminary report by the referring clinician already in PACS and/or our electronic record EPIC  Workstation performed: [id number]         CT head without contrast   Final Result by Marcy Bartholomew DO (04/19 1366)   Cerebral atrophy with chronic small vessel ischemic white matter disease and chronic lacunar infarctions  No acute intracranial abnormality  Workstation performed: [id number]                    Procedures  ECG 12 Lead Documentation  Date/Time: 4/19/2018 7:14 AM  Performed by: Jose Ivan  Authorized by: Dona French     Comments:      Initial EKG interpreted by me:  69 beats per minute normal sinus rhythm left axis deviation right bundle-branch block no ST elevation no ectopics patient also has some ST depression in V2 V3 V4 unchanged from previous EKG    ECG 12 Lead Documentation  Date/Time: 4/19/2018 7:59 AM  Performed by: Jose Ivan  Authorized by: Dona French     Comments:      Was called room for Re exacerbation of the chest pressure 2nd EKG was unchanged from previous 66 beats per minute normal sinus rhythm left axis deviation and right bundle-branch block still with T-wave depression V2 V3 V4 no ST elevation           Phone Contacts  ED Phone Contact    ED Course  ED Course as of Apr 19 0840   u Apr 19, 2018   0749 Called by nurse patient having substernal chest pressure nitroglycerin was ordered and a repeat EKG  [phone] with Dr Albertina Oro and reviewed case in detail patient will be admitted for observation rule out ACS and dizziness    All labs and studies reviewed by this practitioner  Identification of Seniors at [address] [place] [address] Most Recent Value   (ISAR) Identification of Seniors at Risk   Before the illness or injury that brought you to the Emergency, did you need someone to help you on a regular basis? 0 Filed at: 04/19/2018 0737   In the last 24 hours, have you needed more help than usual?  0 Filed at: 04/19/2018 5160   Have you been hospitalized for one or more nights during the past 6 months? 1 Filed at: 04/19/2018 0737   In general, do you see well?  0 Filed at: 04/19/2018 0737   In general, do you have serious problems with your memory? 0 Filed at: 04/19/2018 7619   Do you take more than three different medications every day? 1 Filed at: 04/19/2018 0737   ISAR Score  2 Filed at: 04/19/2018 0280                          [facility]  CritCare Time    Disposition  Final diagnoses:   Chest pain   Dizziness     Time reflects when diagnosis was documented in both MDM as applicable and the Disposition within this note     Time User Action Codes Description Comment    4/19/2018  8:38 AM Rob Parsons [R07 9] Chest pain     4/19/2018  8:38 AM Rob Parsons [R42] Dizziness       ED Disposition     ED Disposition Condition Comment    Admit  Case was discussed with dr Alexandra Burt and the patient's admission status was agreed to be Admission Status: observation status to the service of Dr Alexandra Burt   Follow-up Information    None       Patient's Medications   Discharge Prescriptions    No medications on file     No discharge procedures on file      ED Provider  Electronically Signed by           Kris Tran PA-C  04/19/18 2287

## 2018-04-19 NOTE — ASSESSMENT & PLAN NOTE
Currently resolved    Reports that he had stress test in November 2017-was told it was normal  Follows with cardiologist at [facility]  Trend troponins x3  Check fasting lipids

## 2018-04-20 ENCOUNTER — APPOINTMENT (OUTPATIENT)
Dept: RADIOLOGY | Facility: HOSPITAL | Age: 73
DRG: 546 | End: 2018-04-20
Payer: MEDICARE

## 2018-04-20 ENCOUNTER — APPOINTMENT (INPATIENT)
Dept: NON INVASIVE DIAGNOSTICS | Facility: HOSPITAL | Age: 73
DRG: 546 | End: 2018-04-20
Payer: MEDICARE

## 2018-04-20 PROBLEM — Z98.890 H/O HERNIA REPAIR: Status: ACTIVE | Noted: 2018-04-20

## 2018-04-20 PROBLEM — R42 DIZZINESS: Status: ACTIVE | Noted: 2018-04-19

## 2018-04-20 PROBLEM — Z87.19 H/O HERNIA REPAIR: Status: ACTIVE | Noted: 2018-04-20

## 2018-04-20 LAB
ANION GAP SERPL CALCULATED.3IONS-SCNC: 6 MMOL/L (ref 4–13)
APTT PPP: 124 SECONDS (ref 23–35)
APTT PPP: 41 SECONDS (ref 23–35)
BASOPHILS # BLD AUTO: 0.04 THOUSANDS/ΜL (ref 0–0.1)
BASOPHILS NFR BLD AUTO: 1 % (ref 0–1)
BUN SERPL-MCNC: 11 MG/DL (ref 5–25)
CALCIUM SERPL-MCNC: 8.9 MG/DL (ref 8.3–10.1)
CHLORIDE SERPL-SCNC: 106 MMOL/L (ref 100–108)
CHOLEST SERPL-MCNC: 97 MG/DL (ref 50–200)
CO2 SERPL-SCNC: 28 MMOL/L (ref 21–32)
CREAT SERPL-MCNC: 0.78 MG/DL (ref 0.6–1.3)
EOSINOPHIL # BLD AUTO: 0.09 THOUSAND/ΜL (ref 0–0.61)
EOSINOPHIL NFR BLD AUTO: 1 % (ref 0–6)
ERYTHROCYTE [DISTWIDTH] IN BLOOD BY AUTOMATED COUNT: 14 % (ref 11.6–15.1)
ERYTHROCYTE [DISTWIDTH] IN BLOOD BY AUTOMATED COUNT: 14 % (ref 11.6–15.1)
GFR SERPL CREATININE-BSD FRML MDRD: 91 ML/MIN/1.73SQ M
GLUCOSE SERPL-MCNC: 113 MG/DL (ref 65–140)
HCT VFR BLD AUTO: 38.7 % (ref 36.5–49.3)
HCT VFR BLD AUTO: 39.5 % (ref 36.5–49.3)
HDLC SERPL-MCNC: 41 MG/DL (ref 40–60)
HGB BLD-MCNC: 13.1 G/DL (ref 12–17)
HGB BLD-MCNC: 13.8 G/DL (ref 12–17)
INR PPP: 1.28 (ref 0.86–1.16)
LDLC SERPL CALC-MCNC: 37 MG/DL (ref 0–100)
LYMPHOCYTES # BLD AUTO: 1.81 THOUSANDS/ΜL (ref 0.6–4.47)
LYMPHOCYTES NFR BLD AUTO: 22 % (ref 14–44)
MAGNESIUM SERPL-MCNC: 2.3 MG/DL (ref 1.6–2.6)
MCH RBC QN AUTO: 34.8 PG (ref 26.8–34.3)
MCH RBC QN AUTO: 35.7 PG (ref 26.8–34.3)
MCHC RBC AUTO-ENTMCNC: 33.9 G/DL (ref 31.4–37.4)
MCHC RBC AUTO-ENTMCNC: 34.9 G/DL (ref 31.4–37.4)
MCV RBC AUTO: 102 FL (ref 82–98)
MCV RBC AUTO: 103 FL (ref 82–98)
MONOCYTES # BLD AUTO: 0.73 THOUSAND/ΜL (ref 0.17–1.22)
MONOCYTES NFR BLD AUTO: 9 % (ref 4–12)
NEUTROPHILS # BLD AUTO: 5.54 THOUSANDS/ΜL (ref 1.85–7.62)
NEUTS SEG NFR BLD AUTO: 67 % (ref 43–75)
NONHDLC SERPL-MCNC: 56 MG/DL
NRBC BLD AUTO-RTO: 0 /100 WBCS
PLATELET # BLD AUTO: 164 THOUSANDS/UL (ref 149–390)
PLATELET # BLD AUTO: 172 THOUSANDS/UL (ref 149–390)
PMV BLD AUTO: 10.2 FL (ref 8.9–12.7)
PMV BLD AUTO: 9.9 FL (ref 8.9–12.7)
POTASSIUM SERPL-SCNC: 3.9 MMOL/L (ref 3.5–5.3)
PROTHROMBIN TIME: 16.1 SECONDS (ref 12.1–14.4)
RBC # BLD AUTO: 3.76 MILLION/UL (ref 3.88–5.62)
RBC # BLD AUTO: 3.87 MILLION/UL (ref 3.88–5.62)
SODIUM SERPL-SCNC: 140 MMOL/L (ref 136–145)
TRIGL SERPL-MCNC: 97 MG/DL
TROPONIN I SERPL-MCNC: 0.34 NG/ML
TROPONIN I SERPL-MCNC: 0.55 NG/ML
TROPONIN I SERPL-MCNC: 0.91 NG/ML
WBC # BLD AUTO: 8.22 THOUSAND/UL (ref 4.31–10.16)
WBC # BLD AUTO: 8.39 THOUSAND/UL (ref 4.31–10.16)

## 2018-04-20 PROCEDURE — 99232 SBSQ HOSP IP/OBS MODERATE 35: CPT | Performed by: NURSE PRACTITIONER

## 2018-04-20 PROCEDURE — C1769 GUIDE WIRE: HCPCS | Performed by: NURSE PRACTITIONER

## 2018-04-20 PROCEDURE — 85025 COMPLETE CBC W/AUTO DIFF WBC: CPT | Performed by: INTERNAL MEDICINE

## 2018-04-20 PROCEDURE — 85610 PROTHROMBIN TIME: CPT | Performed by: NURSE PRACTITIONER

## 2018-04-20 PROCEDURE — 85730 THROMBOPLASTIN TIME PARTIAL: CPT | Performed by: NURSE PRACTITIONER

## 2018-04-20 PROCEDURE — 99152 MOD SED SAME PHYS/QHP 5/>YRS: CPT | Performed by: NURSE PRACTITIONER

## 2018-04-20 PROCEDURE — 93306 TTE W/DOPPLER COMPLETE: CPT

## 2018-04-20 PROCEDURE — 93455 CORONARY ART/GRFT ANGIO S&I: CPT | Performed by: NURSE PRACTITIONER

## 2018-04-20 PROCEDURE — 84484 ASSAY OF TROPONIN QUANT: CPT | Performed by: NURSE PRACTITIONER

## 2018-04-20 PROCEDURE — C1894 INTRO/SHEATH, NON-LASER: HCPCS | Performed by: NURSE PRACTITIONER

## 2018-04-20 PROCEDURE — 80048 BASIC METABOLIC PNL TOTAL CA: CPT | Performed by: INTERNAL MEDICINE

## 2018-04-20 PROCEDURE — 93455 CORONARY ART/GRFT ANGIO S&I: CPT | Performed by: INTERNAL MEDICINE

## 2018-04-20 PROCEDURE — 93306 TTE W/DOPPLER COMPLETE: CPT | Performed by: INTERNAL MEDICINE

## 2018-04-20 PROCEDURE — 70551 MRI BRAIN STEM W/O DYE: CPT

## 2018-04-20 PROCEDURE — 85027 COMPLETE CBC AUTOMATED: CPT | Performed by: NURSE PRACTITIONER

## 2018-04-20 PROCEDURE — C1760 CLOSURE DEV, VASC: HCPCS | Performed by: NURSE PRACTITIONER

## 2018-04-20 PROCEDURE — 99152 MOD SED SAME PHYS/QHP 5/>YRS: CPT | Performed by: INTERNAL MEDICINE

## 2018-04-20 PROCEDURE — 80061 LIPID PANEL: CPT | Performed by: INTERNAL MEDICINE

## 2018-04-20 PROCEDURE — B2131ZZ FLUOROSCOPY OF MULTIPLE CORONARY ARTERY BYPASS GRAFTS USING LOW OSMOLAR CONTRAST: ICD-10-PCS | Performed by: INTERNAL MEDICINE

## 2018-04-20 PROCEDURE — 99222 1ST HOSP IP/OBS MODERATE 55: CPT | Performed by: INTERNAL MEDICINE

## 2018-04-20 PROCEDURE — B2111ZZ FLUOROSCOPY OF MULTIPLE CORONARY ARTERIES USING LOW OSMOLAR CONTRAST: ICD-10-PCS | Performed by: INTERNAL MEDICINE

## 2018-04-20 PROCEDURE — 99222 1ST HOSP IP/OBS MODERATE 55: CPT | Performed by: PSYCHIATRY & NEUROLOGY

## 2018-04-20 PROCEDURE — 84484 ASSAY OF TROPONIN QUANT: CPT | Performed by: INTERNAL MEDICINE

## 2018-04-20 PROCEDURE — 83735 ASSAY OF MAGNESIUM: CPT | Performed by: INTERNAL MEDICINE

## 2018-04-20 PROCEDURE — 99153 MOD SED SAME PHYS/QHP EA: CPT | Performed by: NURSE PRACTITIONER

## 2018-04-20 RX ORDER — HEPARIN SODIUM 1000 [USP'U]/ML
2000 INJECTION, SOLUTION INTRAVENOUS; SUBCUTANEOUS AS NEEDED
Status: DISCONTINUED | OUTPATIENT
Start: 2018-04-20 | End: 2018-04-20

## 2018-04-20 RX ORDER — HEPARIN SODIUM 1000 [USP'U]/ML
4000 INJECTION, SOLUTION INTRAVENOUS; SUBCUTANEOUS ONCE
Status: COMPLETED | OUTPATIENT
Start: 2018-04-20 | End: 2018-04-20

## 2018-04-20 RX ORDER — FENTANYL CITRATE 50 UG/ML
INJECTION, SOLUTION INTRAMUSCULAR; INTRAVENOUS CODE/TRAUMA/SEDATION MEDICATION
Status: COMPLETED | OUTPATIENT
Start: 2018-04-20 | End: 2018-04-20

## 2018-04-20 RX ORDER — HEPARIN SODIUM 10000 [USP'U]/100ML
3-20 INJECTION, SOLUTION INTRAVENOUS
Status: DISCONTINUED | OUTPATIENT
Start: 2018-04-20 | End: 2018-04-20

## 2018-04-20 RX ORDER — HEPARIN SODIUM 1000 [USP'U]/ML
4000 INJECTION, SOLUTION INTRAVENOUS; SUBCUTANEOUS AS NEEDED
Status: DISCONTINUED | OUTPATIENT
Start: 2018-04-20 | End: 2018-04-20

## 2018-04-20 RX ORDER — LIDOCAINE HYDROCHLORIDE 10 MG/ML
INJECTION, SOLUTION INFILTRATION; PERINEURAL CODE/TRAUMA/SEDATION MEDICATION
Status: COMPLETED | OUTPATIENT
Start: 2018-04-20 | End: 2018-04-20

## 2018-04-20 RX ORDER — SODIUM CHLORIDE 9 MG/ML
125 INJECTION, SOLUTION INTRAVENOUS CONTINUOUS
Status: DISPENSED | OUTPATIENT
Start: 2018-04-20 | End: 2018-04-21

## 2018-04-20 RX ORDER — SODIUM CHLORIDE 9 MG/ML
75 INJECTION, SOLUTION INTRAVENOUS CONTINUOUS
Status: DISCONTINUED | OUTPATIENT
Start: 2018-04-20 | End: 2018-04-21 | Stop reason: HOSPADM

## 2018-04-20 RX ORDER — MIDAZOLAM HYDROCHLORIDE 1 MG/ML
INJECTION INTRAMUSCULAR; INTRAVENOUS CODE/TRAUMA/SEDATION MEDICATION
Status: COMPLETED | OUTPATIENT
Start: 2018-04-20 | End: 2018-04-20

## 2018-04-20 RX ADMIN — MECLIZINE HYDROCHLORIDE 25 MG: 25 TABLET ORAL at 17:58

## 2018-04-20 RX ADMIN — HEPARIN SODIUM 4000 UNITS: 1000 INJECTION, SOLUTION INTRAVENOUS; SUBCUTANEOUS at 11:13

## 2018-04-20 RX ADMIN — MECLIZINE HYDROCHLORIDE 25 MG: 25 TABLET ORAL at 05:35

## 2018-04-20 RX ADMIN — AMLODIPINE BESYLATE 5 MG: 5 TABLET ORAL at 08:55

## 2018-04-20 RX ADMIN — ASPIRIN 81 MG: 81 TABLET, COATED ORAL at 08:55

## 2018-04-20 RX ADMIN — SODIUM CHLORIDE 125 ML/HR: 0.9 INJECTION, SOLUTION INTRAVENOUS at 16:40

## 2018-04-20 RX ADMIN — METOPROLOL TARTRATE 75 MG: 50 TABLET ORAL at 21:15

## 2018-04-20 RX ADMIN — METOPROLOL TARTRATE 75 MG: 50 TABLET ORAL at 08:55

## 2018-04-20 RX ADMIN — DIAZEPAM 5 MG: 5 INJECTION, SOLUTION INTRAMUSCULAR; INTRAVENOUS at 21:16

## 2018-04-20 RX ADMIN — PRAVASTATIN SODIUM 40 MG: 40 TABLET ORAL at 17:58

## 2018-04-20 RX ADMIN — ENOXAPARIN SODIUM 40 MG: 40 INJECTION SUBCUTANEOUS at 08:55

## 2018-04-20 RX ADMIN — MIDAZOLAM 2 MG: 1 INJECTION INTRAMUSCULAR; INTRAVENOUS at 14:42

## 2018-04-20 RX ADMIN — LIDOCAINE HYDROCHLORIDE 8 ML: 10 INJECTION, SOLUTION INFILTRATION; PERINEURAL at 14:54

## 2018-04-20 RX ADMIN — QUINAPRIL 20 MG: 20 TABLET ORAL at 08:55

## 2018-04-20 RX ADMIN — FENTANYL CITRATE 50 MCG: 50 INJECTION, SOLUTION INTRAMUSCULAR; INTRAVENOUS at 14:42

## 2018-04-20 RX ADMIN — IOHEXOL 150 ML: 350 INJECTION, SOLUTION INTRAVENOUS at 15:34

## 2018-04-20 RX ADMIN — HEPARIN SODIUM AND DEXTROSE 11.8 UNITS/KG/HR: 10000; 5 INJECTION INTRAVENOUS at 11:14

## 2018-04-20 NOTE — ASSESSMENT & PLAN NOTE
History of CABG 2000  Will consult cards due to increasing troponin's   Obtain one more since still trending upwards ?  Why   Asa/ bb

## 2018-04-20 NOTE — CONSULTS
Consultation - Neurology   Edmundo Barbour 72 y o  male MRN: [id number]  Unit/Bed#: CW2 210-02 Encounter: [id number]    Assessment/Plan   79-year-old male with a prior cardiac history, history of hypertension, hyperlipidemia history of colon cancer and former smoker  Presents to [facility] on April 19th after waking up at approximately 1:00 a m  With sudden onset of dizziness and vertigo "room spinning", this was accompanied by diaphoresis , chest pain, and nausea  Vertigo has improved with medications, patients troponins and will be undergoing a cardiac cath  Exam is more consistent with a peripheral etiology - vestibular neuritis versus for BPPV  -  reviewed MRI of the brain with attending - no acute stroke, chronic microhemorrhages noted (should be followed by neurology Dr Nathan Barahona in regards to micro hemorrhages)  -  no further neurological workup at this time, cardiology will be following in regards to his chest pain  -  notify neurology with any changes  History of Present Illness     Reason for Consult / Principal Problem: Dizziness    HPI: Edmundo Barbour is a 72 y o   male who presented to ED yesterday after waking up at 1:00 a m  on April 19th with sudden onset of dizziness/vertigo, felt like the room was spinning, he also had chest tightness  At that time he notified EMS  CT of the head was completed which showed cerebral atrophy with chronic small-vessel ischemia white matter disease and chronic lacunar infarcts, this reported that his dizziness improved with meclizine and Valium  It was noted that the patient's troponin elevated and Cardiology was consulted  He has a history of CAD and CABG x5, he was evaluated by cardiology and will placed on a heparin drip/catherization  Neurology was asked to see the patient in regards to his vertigo, rule out posterior circulation event    He reports occasion dizziness/vertigo today when he move his head to the left, no focal neurological symptoms  No ataxia on examination  The patient reports when he awoken yesterday morning it he described the dizziness as occurring when moving the head to the left, described as the room spinning, he reports this would come and go in short bursts  He reports when he laid still this would resolve  However, when he moved his head to the left it would return, no neurological symptoms associated with this  This was associated with nausea, diaphoresis, and chest pain  No prior neurological hx reported, in particular no prior hx of stroke  He report currently he feels well, he only has a little vertigo when moving his head to the left but improved  He denies dysarthria, dysphagia, expressive aphasia, ataxia, one sided weakness or paraesthesia  He reports he never had these symptoms before  Reviewed MRI of brain with Dr Jose Roach and patient - no acute intracranial abnormality - micro hemorrhages noted  Reviewed pmhx and surgical hx  ROS as above  Allergies to PCN - as reviewed  Inpatient consult to Neurology  Consult performed by: Manuel Clayton ordered by: Lizbeth Vargas        Review of Systems   HENT: Negative  Eyes: Negative  Respiratory: Negative  Cardiovascular: Negative  Gastrointestinal: Negative  Endocrine: Negative  Genitourinary: Negative  Neurological: Positive for dizziness  Negative for tremors, seizures, syncope, facial asymmetry, speech difficulty, weakness, light-headedness, numbness and headaches  Psychiatric/Behavioral: Negative        Historical Information   Past Medical History:   Diagnosis Date    [facility]     colon    Hyperlipidemia     Hypertension     [facility] 2000    Ventral hernia      Past Surgical History:   Procedure Laterality Date    COLON SURGERY      colon resection    CORONARY ARTERY BYPASS GRAFT      x5    [place] REPAIR INCISIONAL HERNIA,REDUCIBLE N/A 3/2/2018    Procedure: REPAIR HERNIA VENTRAL WITH MESH;  Surgeon: Patricia Chavez MD;  [facility];  Service: General     Social History   History   Alcohol Use    7 2 oz/week    12 Cans of beer per week     History   Drug use: Unknown     History   Smoking Status    Former Smoker    Quit date: 1986   Smokeless Tobacco    Never Used     Family History: History reviewed  No pertinent family history  Review of previous medical records was completed, no prior neurological encounters noted  Meds/Allergies   all current active meds have been reviewed, current meds:   Current Facility-Administered Medications   Medication Dose Route Frequency     EMS REPLENISHMENT MED   Does not apply Once    acetaminophen (TYLENOL) tablet 650 mg  650 mg Oral Q4H PRN    aluminum-magnesium hydroxide-simethicone (MYLANTA) 200-200-20 mg/5 mL oral suspension 30 mL  30 mL Oral Q6H PRN    amLODIPine (NORVASC) tablet 5 mg  5 mg Oral Daily    aspirin (ECOTRIN LOW STRENGTH) EC tablet 81 mg  81 mg Oral Daily    diazepam (VALIUM) injection 5 mg  5 mg Intravenous HS    enoxaparin (LOVENOX) subcutaneous injection 40 mg  40 mg Subcutaneous Daily    heparin (porcine) 25,000 units in 250 mL infusion (premix)  3-20 Units/kg/hr (Order-Specific) Intravenous Titrated    heparin (porcine) injection 2,000 Units  2,000 Units Intravenous PRN    heparin (porcine) injection 4,000 Units  4,000 Units Intravenous Once    heparin (porcine) injection 4,000 Units  4,000 Units Intravenous PRN    meclizine (ANTIVERT) tablet 25 mg  25 mg Oral Q8H [address]    metoprolol tartrate (LOPRESSOR) tablet 75 mg  75 mg Oral Q12H [address]    ondansetron (ZOFRAN) injection 4 mg  4 mg Intravenous Q6H PRN    pravastatin (PRAVACHOL) tablet 40 mg  40 mg Oral After Dinner    quinapril (ACCUPRIL) tablet 20 mg  20 mg Oral Daily    and PTA meds:   Prior to Admission Medications   Prescriptions Last Dose Informant Patient Reported? Taking?    AMLODIPINE BESYLATE PO 4/18/2018 at Unknown time  Yes Yes   Sig: Take 5 mg by mouth daily   Calcium Carbonate-Vitamin D (CALCIUM 600+D PO) 4/18/2018 at Unknown time  Yes Yes   Sig: Take 1 tablet by mouth daily   Multiple Vitamins-Iron (MULTIVITAMIN/IRON PO) 4/18/2018 at Unknown time  Yes Yes   Sig: Take 1 tablet by mouth daily   Omega-3 Fatty Acids (FISH OIL) 1000 MG CPDR 4/18/2018 at Unknown time  Yes Yes   Sig: Take 1 capsule by mouth daily   aspirin (ECOTRIN LOW STRENGTH) 81 mg EC tablet 4/19/2018 at Unknown time  Yes Yes   Sig: Take 81 mg by mouth daily   metoprolol tartrate (LOPRESSOR) 50 mg tablet 4/18/2018 at Unknown time  Yes Yes   Sig: Take 75 mg by mouth every 12 (twelve) hours   quinapril (ACCUPRIL) 20 mg tablet 4/18/2018 at Unknown time  Yes Yes   Sig: Take 20 mg by mouth daily   simvastatin (ZOCOR) 40 mg tablet 4/18/2018 at Unknown time  Yes Yes   Sig: Take 40 mg by mouth daily at bedtime      Facility-Administered Medications: None     Allergies   Allergen Reactions    Penicillins Shortness Of Breath and Rash     Objective   Vitals:Blood pressure 134/66, pulse 68, temperature 98 3 °F (36 8 °C), temperature source Oral, resp  rate 16, height 5' 9" (1 753 m), weight 88 5 kg (195 lb), SpO2 90 %  ,Body mass index is 28 8 kg/m²  Intake/Output Summary (Last 24 hours) at 04/20/18 1058  Last data filed at 04/20/18 5049   Gross per 24 hour   Intake              180 ml   Output              400 ml   Net             -220 ml     Invasive Devices: Invasive Devices     Peripheral Intravenous Line            Peripheral IV 04/19/18 Right Antecubital less than 1 day              Physical Exam   Constitutional: He is oriented to person, place, and time  He appears well-developed and well-nourished  He appears distressed  HENT:   Head: Normocephalic and atraumatic  Mouth/Throat: No oropharyngeal exudate  Eyes: Conjunctivae and EOM are normal  Pupils are equal, round, and reactive to light  Right eye exhibits no discharge  Left eye exhibits no discharge     Cardiovascular: Normal rate and regular rhythm  No murmur heard  Pulmonary/Chest: Effort normal and breath sounds normal  No respiratory distress  He has no wheezes  Abdominal: He exhibits no distension  Musculoskeletal: He exhibits no edema  Neurological: He is oriented to person, place, and time  He has normal strength  He has a normal Finger-Nose-Finger Test, a normal Heel to [employer] and a normal Romberg Test  Gait normal    Reflex Scores:       Tricep reflexes are 1+ on the right side and 1+ on the left side  Bicep reflexes are 1+ on the right side and 1+ on the left side  Brachioradialis reflexes are 1+ on the right side and 1+ on the left side  Patellar reflexes are 1+ on the right side and 1+ on the left side  Skin: He is not diaphoretic  Psychiatric: His speech is normal    Vitals reviewed  Neurologic Exam     Mental Status   Oriented to person, place, and time  Follows 2 step commands  Attention: normal  Concentration: normal    Speech: speech is normal   Level of consciousness: alert  Knowledge: good  Able to perform simple calculations  Able to name object  Able to read  Able to repeat  Able to write  Cranial Nerves     CN II   Visual fields full to confrontation  CN III, IV, VI   Pupils are equal, round, and reactive to light  Extraocular motions are normal      CN V   Facial sensation intact  CN VII   Facial expression full, symmetric  CN VIII   CN VIII normal      CN IX, X   CN IX normal    CN X normal      CN XI   CN XI normal      CN XII   CN XII normal    + end gaze nystagmus left greater than right     Motor Exam   Muscle bulk: normal  Right arm pronator drift: absent  Left arm pronator drift: absent    Strength   Strength 5/5 throughout       Sensory Exam   Light touch normal    Vibration normal      Gait, Coordination, and Reflexes     Gait  Gait: normal    Coordination   Romberg: negative  Finger to nose coordination: normal  Heel to shin coordination: normal    Tremor   Resting tremor: absent  Intention tremor: absent  Action tremor: absent    Reflexes   Right brachioradialis: 1+  Left brachioradialis: 1+  Right biceps: 1+  Left biceps: 1+  Right triceps: 1+  Left triceps: 1+  Right patellar: 1+  Left patellar: 1+  Right plantar: normal  Left plantar: normal    Lab Results:    Recent Results (from the past 24 hour(s))   Troponin I    Collection Time: 04/19/18  6:06 PM   Result Value Ref Range    Troponin I 0 32 (H) <=0 04 ng/mL   Platelet count    Collection Time: 04/19/18  6:06 PM   Result Value Ref Range    Platelets 902 024 - 201 Thousands/uL    MPV 9 7 8 9 - 12 7 fL   Troponin I    Collection Time: 04/19/18  8:51 PM   Result Value Ref Range    Troponin I 0 33 (H) <=0 04 ng/mL   Troponin I    Collection Time: 04/19/18 11:56 PM   Result Value Ref Range    Troponin I 0 34 (H) <=0 04 ng/mL   Basic metabolic panel    Collection Time: 04/20/18  4:32 AM   Result Value Ref Range    Sodium 140 136 - 145 mmol/L    Potassium 3 9 3 5 - 5 3 mmol/L    Chloride 106 100 - 108 mmol/L    CO2 28 21 - 32 mmol/L    Anion Gap 6 4 - 13 mmol/L    BUN 11 5 - 25 mg/dL    Creatinine 0 78 0 60 - 1 30 mg/dL    Glucose 113 65 - 140 mg/dL    Calcium 8 9 8 3 - 10 1 mg/dL    eGFR 91 ml/min/1 73sq m   Magnesium    Collection Time: 04/20/18  4:32 AM   Result Value Ref Range    Magnesium 2 3 1 6 - 2 6 mg/dL   Lipid panel    Collection Time: 04/20/18  4:32 AM   Result Value Ref Range    Cholesterol 97 50 - 200 mg/dL    Triglycerides 97 <=150 mg/dL    HDL, Direct 41 40 - 60 mg/dL    LDL Calculated 37 0 - 100 mg/dL    Non-HDL-Chol (CHOL-HDL) 56 mg/dl   CBC and differential    Collection Time: 04/20/18  4:32 AM   Result Value Ref Range    WBC 8 22 4 31 - 10 16 Thousand/uL    RBC 3 76 (L) 3 88 - 5 62 Million/uL    Hemoglobin 13 1 12 0 - 17 0 g/dL    Hematocrit 38 7 36 5 - 49 3 %     (H) 82 - 98 fL    MCH 34 8 (H) 26 8 - 34 3 pg    MCHC 33 9 31 4 - 37 4 g/dL    RDW 14 0 11 6 - 15 1 %    MPV 10 2 8 9 - 12 7 fL    Platelets 477 968 - 019 Thousands/uL    nRBC 0 /100 WBCs    Neutrophils Relative 67 43 - 75 %    Lymphocytes Relative 22 14 - 44 %    Monocytes Relative 9 4 - 12 %    Eosinophils Relative 1 0 - 6 %    Basophils Relative 1 0 - 1 %    Neutrophils Absolute 5 54 1 85 - 7 62 Thousands/µL    Lymphocytes Absolute 1 81 0 60 - 4 47 Thousands/µL    Monocytes Absolute 0 73 0 17 - 1 22 Thousand/µL    Eosinophils Absolute 0 09 0 00 - 0 61 Thousand/µL    Basophils Absolute 0 04 0 00 - 0 10 Thousands/µL   Troponin I    Collection Time: 04/20/18  4:32 AM   Result Value Ref Range    Troponin I 0 55 (H) <=0 04 ng/mL   Troponin I    Collection Time: 04/20/18  8:58 AM   Result Value Ref Range    Troponin I 0 91 (H) <=0 04 ng/mL     Imaging Studies:   Procedure: Xr Chest 2 Views    Result Date: 4/19/2018  Narrative: CHEST INDICATION:   chest pain  COMPARISON:  03/08/2012 EXAM PERFORMED/VIEWS:  XR CHEST PA & LATERAL Images: 3 FINDINGS: Cardiomediastinal silhouette appears enlarged  A median sternotomy has been performed  The lungs are clear  No pneumothorax or pleural effusion  No evidence of heart failure  Osseous structures appear within normal limits for patient age  Impression: No acute cardiopulmonary disease  Cardiomegaly  Findings concur with the preliminary report by the referring clinician already in PACS and/or our electronic record EPIC  Workstation performed: [id number]     Procedure: Ct Head Without Contrast    Result Date: 4/19/2018  Narrative: CT BRAIN - WITHOUT CONTRAST INDICATION:   dizzy  Severe dizziness  Nausea  COMPARISON:  None  TECHNIQUE:  CT examination of the brain was performed  In addition to axial images, coronal 2D reformatted images were created and submitted for interpretation  Radiation dose length product (DLP) for this visit:  993 26 mGy-cm     This examination, like all CT scans performed in the [facility], was performed utilizing techniques to minimize radiation dose exposure, including the use of iterative  reconstruction and automated exposure control  IMAGE QUALITY:  Diagnostic  FINDINGS: PARENCHYMA: Decreased attenuation is noted in periventricular and subcortical white matter demonstrating an appearance that is statistically most likely to represent moderate microangiopathic change  Chronic lacunar infarction(s) are noted in basal ganglia  No CT signs of acute infarction  No intracranial mass, mass effect or midline shift  No acute parenchymal hemorrhage  Atherosclerotic vascular calcifications in carotid and vertebral arteries are more advanced than would be expected for the patient's  age  VENTRICLES AND EXTRA-AXIAL SPACES:  Enlargement of ventricles and extra-axial CSF spaces, out of proportion to the patient's age most consistent with cerebral and cerebellar atrophy  VISUALIZED ORBITS AND PARANASAL SINUSES:  Unremarkable  CALVARIUM AND EXTRACRANIAL SOFT TISSUES:  Normal      Impression: Cerebral atrophy with chronic small vessel ischemic white matter disease and chronic lacunar infarctions  No acute intracranial abnormality  Workstation performed: [id number]     Code Status: Level 1 - Full Code    Counseling / Coordination of Care  Total time spent today 40 minutes  Greater than 50% of total time was spent with the patient and / or family counseling and / or coordination of care   A description of the counseling / coordination of care: floor time, reviewing imaging, tests and notes, exam

## 2018-04-20 NOTE — PLAN OF CARE
Problem: Potential for Falls  Goal: Patient will remain free of falls  INTERVENTIONS:  - Assess patient frequently for physical needs  -  Identify cognitive and physical deficits and behaviors that affect risk of falls    -  Deputy fall precautions as indicated by assessment   - Educate patient/family on patient safety including physical limitations  - Instruct patient to call for assistance with activity based on assessment  - Modify environment to reduce risk of injury  - Consider OT/PT consult to assist with strengthening/mobility   Outcome: Progressing

## 2018-04-20 NOTE — PHYSICIAN ADVISOR
Current patient class: Inpatient  The patient is currently on Hospital Day: 2      The patient was admitted to the hospital at 1102 on 4/20/18 for the following diagnosis:  Dizziness [R42]  Vertigo [R42]  Chest pain [R07 9]       There is documentation in the medical record of an expected length of stay of at least 2 midnights  The patient is therefore expected to satisfy the 2 midnight benchmark and given the 2 midnight presumption is appropriate for INPATIENT ADMISSION  Given this expectation of a satisfying stay, CMS instructs us that the patient is most often appropriate for inpatient admission under part A provided medical necessity is documented in the chart  After review of the relevant documentation, labs, vital signs and test results, the patient is appropriate for INPATIENT ADMISSION  Admission to the hospital as an inpatient is a complex decision making process which requires the practitioner to consider the patients presenting complaint, history and physical examination and all relevant testing  With this in mind, in this case, the patient was deemed appropriate for INPATIENT ADMISSION  After review of the documentation and testing available at the time of the admission I concur with this clinical determination of medical necessity  Rationale is as follows: The patient is a 72 yrs old Male who presented to the ED at 4/19/2018  6:58 AM with a chief complaint of Dizziness (pt woke with room spinning- with EMS pt also with chest pain that resolved with ASA and NGT slg, also given zofran IVP and valium IVP)     Patient was admitted to the hospital for dizziness, and was being evaluated for benign paroxysmal positional vertigo  The patient however continues to remain hospitalized needing advanced imaging of the brain to evaluate for possible CV A  Additionally the patient was complaining of chest pain, and Cardiology has been consulted    Patient was noted to have an elevation of troponin, without significant EKG changes  The patient however is recommended to have cardiac catheterization, and the patient will remain hospitalized for at least 2 midnights for completion of that, and the further imaging for the brain  Patient does have multiple risk factors, with possible adverse outcome  Given this, the patient is appropriate for inpatient admission as he will satisfy the 2 midnight benchmark, and does require continued acute medical care  At the time of admission the patient was expected to remain hospitalized for at least 2 midnights, and using the information present at that time, the concerns of the admitting physician, the patient was appropriately admitted to the hospital as an inpatient      The patients vitals on arrival were ED Triage Vitals   Temperature Pulse Respirations Blood Pressure SpO2   04/19/18 0733 04/19/18 0733 04/19/18 0733 04/19/18 0733 04/19/18 0733   97 5 °F (36 4 °C) 70 18 137/71 93 %      Temp Source Heart Rate Source Patient Position - Orthostatic VS BP Location FiO2 (%)   04/19/18 0733 04/19/18 1355 04/19/18 1515 04/19/18 1504 --   Oral Monitor Sitting Right arm       Pain Score       04/19/18 0733       No Pain           Past Medical History:   Diagnosis Date    [facility] )     colon    Hyperlipidemia     Hypertension     Myocardial infarction ([facility] ) 2000    Ventral hernia      Past Surgical History:   Procedure Laterality Date    COLON SURGERY      colon resection    CORONARY ARTERY BYPASS GRAFT      x5    [place] REPAIR INCISIONAL HERNIA,REDUCIBLE N/A 3/2/2018    Procedure: REPAIR HERNIA VENTRAL WITH MESH;  Surgeon: Alicia Ruiz MD;  Location[facility];  Service: General           Consults have been placed to:   IP CONSULT TO CARDIOLOGY  IP CONSULT TO NEUROLOGY    Vitals:    04/20/18 1615 04/20/18 1631 04/20/18 1646 04/20/18 1705   BP: 110/64 123/76 112/74 127/77   BP Location: Left arm Left arm Left arm Left arm   Pulse: 69 67 66 66   Resp: 20 20 20 18   Temp: 98 4 °F (36 9 °C) 97 9 °F (36 6 °C) 98 °F (36 7 °C) 98 8 °F (37 1 °C)   TempSrc: Oral Oral Oral Axillary   SpO2: 91% 93% 91% 92%   Weight: 89 6 kg (197 lb 8 5 oz)      Height: 5' 8" (1 727 m)          Most recent labs:    Recent Labs      04/19/18   0720   04/20/18   0432  04/20/18   0858  04/20/18   1355   WBC  4 61   --   8 22   --   8 39   HGB  14 3   --   13 1   --   13 8   HCT  42 0   --   38 7   --   39 5   PLT  152   < >  164   --   172   K  5 0   --   3 9   --    --    NA  139   --   140   --    --    CALCIUM  9 0   --   8 9   --    --    BUN  9   --   11   --    --    CREATININE  0 84   --   0 78   --    --    INR   --    --    --    --   1 28*   TROPONINI  <0 02   < >  0 55*  0 91*   --    AST  59*   --    --    --    --    ALT  49   --    --    --    --    ALKPHOS  66   --    --    --    --    BILITOT  0 84   --    --    --    --     < > = values in this interval not displayed         Scheduled Meds:  Current Facility-Administered Medications:  EMS replenish medication  Does not apply Once Triage Protocol Emergency, MD    acetaminophen 650 mg Oral Q4H PRN Niecy Brownlee MD    aluminum-magnesium hydroxide-simethicone 30 mL Oral Q6H PRN Niecy Brownlee MD    amLODIPine 5 mg Oral Daily Niecy Brownlee MD    aspirin 81 mg Oral Daily Niecy Brownlee MD    diazepam 5 mg Intravenous HS Niecy Brownlee MD    enoxaparin 40 mg Subcutaneous Daily Niecy Brownlee MD    meclizine 25 mg Oral Q8H [facility] Niecy Brownlee MD    metoprolol tartrate 75 mg Oral Q12H [facility] Niecy Brownlee MD    ondansetron 4 mg Intravenous Q6H PRN Niecy Brownlee MD    pravastatin 40 mg Oral After Dinner Niecy Brownlee MD    quinapril 20 mg Oral Daily Niecy Brownlee MD    sodium chloride 75 mL/hr Intravenous Continuous DANIEL Parra    sodium chloride 125 mL/hr Intravenous Continuous Teddy Rodriguez MD Last Rate: 125 mL/hr (04/20/18 1640)     Continuous Infusions:  sodium chloride 75 mL/hr    sodium chloride 125 mL/hr Last Rate: 125 mL/hr (04/20/18 1640)     PRN Meds: acetaminophen    aluminum-magnesium hydroxide-simethicone    ondansetron    Surgical procedures (if appropriate):

## 2018-04-20 NOTE — CASE MANAGEMENT
Initial Clinical Review    OBSERVATION 4/19 @ 0839 CHANGED TO INPATIENT DUE TO NEEDS CONTINUED CARDIOLOGY AND NEUROLOGY WORKUP, +NSTEMI    Admission: Date/Time/Statement: 4/19 @ 0839    Orders Placed This Encounter   Procedures    Place in Observation (expected length of stay for this patient is less than two midnights)     Standing Status:   Standing     Number of Occurrences:   1     Order Specific Question:   Admitting Physician     Answer:   Holden Fuentes [[id number]]     Order Specific Question:   Level of Care     Answer:   Med Surg [16]         ED: Date/Time/Mode of Arrival:   ED Arrival Information     Expected Arrival Acuity Means of Arrival Escorted By Service Admission Type    4/19/2018 06:45 4/19/2018 06:58 Emergent Ambulance [facility] General Medicine Emergency    Arrival Complaint    vertigo          Chief Complaint:   Chief Complaint   Patient presents with    Dizziness     pt woke with room spinning- with EMS pt also with chest pain that resolved with ASA and NGT slg, also given zofran IVP and valium IVP       History of Illness: Chelita Fuentes is a 72 y o  male who presents symptoms of vertigo  Patient reports that he woke up around 2:00 a m , noticed that the whole room was spinning, he felt very dizzy and lightheaded  He was unsteady on feet  He felt nauseous and was retching  Subsequently developed chest pain on the left  Called EMS who gave him nitroglycerin and aspirin        ED Vital Signs:   ED Triage Vitals   Temperature Pulse Respirations Blood Pressure SpO2   04/19/18 0733 04/19/18 0733 04/19/18 0733 04/19/18 0733 04/19/18 0733   97 5 °F (36 4 °C) 70 18 137/71 93 %      Temp Source Heart Rate Source Patient Position - Orthostatic VS BP Location FiO2 (%)   04/19/18 0733 04/19/18 1355 04/19/18 1515 04/19/18 1504 --   Oral Monitor Sitting Right arm       Pain Score       04/19/18 0733       No Pain        Wt Readings from Last 1 Encounters:   04/19/18 88 5 kg (195 lb) Vital Signs (abnormal):  O2 sat 90%, 92%, 92%,  RA    Abnormal Labs/Diagnostic Test Results:     Troponin <0 02, 0 32, 0 33, 0 34, 0 55, 0 91    K 5 0 glucose 159    EKG: Wide QRS rhythm with Fusion complexes  Left axis deviation  Low voltage QRS  Right bundle branch block  Inferior infarct , age undetermined  Possible Anterolateral infarct , age undetermined    EKG 2  Normal sinus rhythm  Left axis deviation  Right bundle branch block  Anterolateral infarct , age undetermined  Abnormal ECG  When compared with ECG of 19-APR-2018 07:09, (unconfirmed)  Sinus rhythm has replaced Wide QRS rhythm     EKG 3  Normal sinus rhythm  Left axis deviation  Right bundle branch block  Possible Lateral infarct     CXR  Enlarged heart similar to previous no acute finding    CT head  Cerebral atrophy with chronic small vessel ischemic white matter disease and chronic lacunar infarctions  No acute intracranial abnormality  ED Treatment:   Medication Administration from 04/19/2018 0645 to 04/19/2018 1654       Date/Time Order Dose Route Action Comments     04/19/2018 0722 sodium chloride 0 9 % bolus 1,000 mL 1,000 mL Intravenous New Bag      04/19/2018 0746 meclizine (ANTIVERT) tablet 25 mg 25 mg Oral Given      04/19/2018 0754 nitroglycerin (NITROSTAT) SL tablet 0 4 mg 0 4 mg Sublingual Given      04/19/2018 0804 nitroglycerin (NITRO-BID) 2 % TD ointment 1 inch 1 inch Topical Given      04/19/2018 1015 diazepam (VALIUM) injection 2 mg 2 mg Intravenous Given      04/19/2018 1411 meclizine (ANTIVERT) tablet 25 mg 25 mg Oral Given           Past Medical/Surgical History:    Active Ambulatory Problems     Diagnosis Date Noted    No Active Ambulatory Problems     Resolved Ambulatory Problems     Diagnosis Date Noted    No Resolved Ambulatory Problems     Past Medical History:   Diagnosis Date    Cancer ([facility] [address] [facility]     Hyperlipidemia     Hypertension     Myocardial infarction ([facility] [address] [facility] 2000    Ventral hernia        Admitting Diagnosis: Dizziness [R42]  Vertigo [R42]  Chest pain [R07 9]    Age/Sex: 72 y o  male    Assessment/Plan:   * Benign paroxysmal positional vertigo due to bilateral vestibular disorder   Assessment & Plan     Likely BPPV- given history of vascular disease/chronic lacunar infarctions rule out posterior circulation stroke     Supportive care  Check MRI brain   Meclizine  Hydration  Outpatient vestibular therapy  Continue aspirin/statin          Precordial pain   Assessment & Plan     Currently resolved     Reports that he had stress test in November 2017-was told it was normal  Follows with cardiologist at [facility]  Trend troponins x3  Check fasting lipids          Essential hypertension   Assessment & Plan     Resume outpatient medications and monitor          Hx of CABG   Assessment & Plan     History of CABG 2000  Outpatient Cardiology follow-up          HLD (hyperlipidemia)   Assessment & Plan     Continue statin and check fasting lipids             VTE Prophylaxis: Enoxaparin (Lovenox)  / sequential compression device   Code Status:  Full code     Certification Statement: The patient, iinitially admitted on an observation basis, will now require > 2 midnight hospital stay due to pending further workup per cards and neuro       Admission Orders:  Scheduled Meds:   Current Facility-Administered Medications:  EMS replenish medication  Does not apply Once Triage Protocol Emergency, MD   acetaminophen 650 mg Oral Q4H PRN Niecy Brownlee MD   aluminum-magnesium hydroxide-simethicone 30 mL Oral Q6H PRN Niecy Brownlee MD   amLODIPine 5 mg Oral Daily Niecy Brownlee MD   aspirin 81 mg Oral Daily Niecy Brownlee MD   diazepam 5 mg Intravenous HS Niecy Brownlee MD   enoxaparin 40 mg Subcutaneous Daily Niecy Brownlee MD   heparin (porcine) 3-20 Units/kg/hr (Order-Specific) Intravenous Titrated DANIEL Parra   heparin (porcine) 2,000 Units Intravenous PRN Frantz Angela DANIEL Santoyo   heparin (porcine) 4,000 Units Intravenous Once DANIEL Pugh   heparin (porcine) 4,000 Units Intravenous PRN DANIEL Pugh   meclizine 25 mg Oral Q8H [address] Niecy Brownlee MD   metoprolol tartrate 75 mg Oral Q12H [address] Niecy Brownlee MD   ondansetron 4 mg Intravenous Q6H PRN Niecy Brownlee MD   pravastatin 40 mg Oral After Dinner Niecy Brownlee MD   quinapril 20 mg Oral Daily Niecy Brownlee MD     Continuous Infusions:   heparin (porcine) 3-20 Units/kg/hr (Order-Specific)     PRN Meds: acetaminophen    aluminum-magnesium hydroxide-simethicone    heparin (porcine)    heparin (porcine)    ondansetron    MRI brain  Consult cardiology  Activity as surjit  Telemetry  Echo  BMP, CBC, 4/21  Cardiac cath - NSTEMI  Consult Neurology  SCD's  Ptt 6 hrs after initiation of heparin gtt    ========================================================================  Cardiology progress note:    Assessment/ Plan     NSTEMI: presented trop 0 02; currently 0 91; pt had chest pain prior to arrival in ED; no pain since  Troponin continues to trend up    Recommend cardiac catheterization; will need to discuss with patient when he returns from MRI              - Start IV heparin              - Continue ASA, statin, BB; give additional 243 ASA this AM for full dose              - Renal function WNL, start IVF     Hx of CAD: with prior stenting and CABG x5; unknown anatomy; all procedures at [facility]    Continue ASA, statin, BB  Records requested     HTN: BP control adequate     HLD: continue statin     Vertigo/abnormal CT head: getting MRI head and neuro consult pending

## 2018-04-20 NOTE — PROGRESS NOTES
Progress Note - Teresa Mccormack 1945, 72 y o  male MRN: [id number]    Unit/Bed#: CW2 210-02 Encounter: [id number]    Primary Care Provider: Atif Gamboa DO   Date and time admitted to hospital: 4/19/2018  6:58 AM        * Dizziness   Assessment & Plan    Unsure etiology possibly BPPV- given history of vascular disease/chronic lacunar infarctions rule out posterior circulation stroke  Will need to rlo cva tia   Will consult neuro, will discuss need for pathway and possible cta head neck     Supportive care  Check MRI brain : pending will require greater than 2 midnight stay   Meclizine prn   Hydration  Outpatient vestibular therapy, if deemed vestibular will consult neuro for ongoing workup at this time since, not on stroke pathway   Continue aspirin/statin   Ct head demonstrates Cerebral atrophy with chronic small vessel ischemic white matter disease and chronic lacunar infarctions  No acute intracranial abnormality  Ekg: Normal sinus rhythm  Left axis deviation  Right bundle branch block  Possible Lateral infarct (cited on or before 19-APR-2018)  Abnormal ECG        H/O hernia repair   Assessment & Plan    Recent history of hernia repair trocar site left abd with midline trocar site healed no tenderness although pt does have some epigastric tenderness with palpation   Ventral hernia repair 3/2/18        Essential hypertension   Assessment & Plan    Will need to monitor currently stable no elevation noted on admission         HLD (hyperlipidemia)   Assessment & Plan    Continue statin  Fasting lipid panel stable         Hx of CABG   Assessment & Plan    History of CABG 2000  Will consult cards due to increasing troponin's   Obtain one more since still trending upwards ?  Why   Asa/ bb         Precordial pain   Assessment & Plan    Currently resolved, now with elevated troponins     Reports that he had stress test in November 2017-was told it was normal  Follows with cardiologist at [facility]  troponin trending upward last 0 55 will chk one more   fasting lipids stable             VTE Pharmacologic Prophylaxis:   Pharmacologic: Enoxaparin (Lovenox)  Mechanical VTE Prophylaxis in Place: Yes    Patient Centered Rounds: I have performed bedside rounds with nursing staff today  Discussions with Specialists or Other Care Team Provider: nursing     Education and Discussions with Family / Patient: patient     Time Spent for Care: 45 minutes  More than 50% of total time spent on counseling and coordination of care as described above  Current Length of Stay: 0 day(s)    Current Patient Status: Observation   Certification Statement: The patient, admitted on an observation basis, will now require > 2 midnight hospital stay due to pending further workup per cards and neuro    Discharge Plan: pending final impressions from cards    Code Status: Level 1 - Full Code      Subjective:   Pt reports feeling dizzy when he turns head more to the right vs left  It has improved since admission  Pt does state that he had severe chest pain after feeling dizzy yesterday and feeling diaphoretic now with no chest pain  Did recently have a hernia repair      Objective:     Vitals:   Temp (24hrs), Av °F (36 7 °C), Min:97 7 °F (36 5 °C), Max:98 3 °F (36 8 °C)    HR:  [62-80] 68  Resp:  [12-18] 16  BP: (122-162)/(62-78) 134/66  SpO2:  [90 %-97 %] 90 %  Body mass index is 28 8 kg/m²  Input and Output Summary (last 24 hours): Intake/Output Summary (Last 24 hours) at 18 0939  Last data filed at 18 6651   Gross per 24 hour   Intake             1180 ml   Output              400 ml   Net              780 ml       Physical Exam:     Physical Exam   Constitutional: He is oriented to person, place, and time  He appears well-developed and well-nourished  No distress  HENT:   Head: Normocephalic and atraumatic  Mouth/Throat: No oropharyngeal exudate     Eyes: Conjunctivae are normal  Pupils are equal, round, and reactive to light  Right eye exhibits no discharge  Left eye exhibits no discharge  No scleral icterus  Neck: Normal range of motion  No JVD present  No tracheal deviation present  No thyromegaly present  Cardiovascular: Normal rate  Exam reveals no gallop and no friction rub  No murmur heard  Pulmonary/Chest: Effort normal  No stridor  No respiratory distress  He has no wheezes  He has no rales  He exhibits no tenderness  Abdominal: Soft  He exhibits no distension and no mass  There is no tenderness  There is no rebound and no guarding  Musculoskeletal: He exhibits no edema, tenderness or deformity  Lymphadenopathy:     He has no cervical adenopathy  Neurological: He is oriented to person, place, and time  Skin: No rash noted  He is not diaphoretic  No erythema  No pallor  Psychiatric: He has a normal mood and affect  Additional Data:     Labs:      Results from last 7 days  Lab Units 04/20/18  0432   WBC Thousand/uL 8 22   HEMOGLOBIN g/dL 13 1   HEMATOCRIT % 38 7   PLATELETS Thousands/uL 164   NEUTROS PCT % 67   LYMPHS PCT % 22   MONOS PCT % 9   EOS PCT % 1       Results from last 7 days  Lab Units 04/20/18  0432 04/19/18  0720   SODIUM mmol/L 140 139   POTASSIUM mmol/L 3 9 5 0   CHLORIDE mmol/L 106 107   CO2 mmol/L 28 24   BUN mg/dL 11 9   CREATININE mg/dL 0 78 0 84   CALCIUM mg/dL 8 9 9 0   TOTAL PROTEIN g/dL  --  7 9   BILIRUBIN TOTAL mg/dL  --  0 84   ALK PHOS U/L  --  66   ALT U/L  --  49   AST U/L  --  59*   GLUCOSE RANDOM mg/dL 113 159*           * I Have Reviewed All Lab Data Listed Above  * Additional Pertinent Lab Tests Reviewed:  All Labs Within Last 24 Hours Reviewed    Imaging:    Imaging Reports Reviewed Today Include: reviewed       Recent Cultures (last 7 days):           Last 24 Hours Medication List:     Current Facility-Administered Medications:  EMS replenish medication  Does not apply Once Triage Protocol Emergency, MD   acetaminophen 650 mg Oral Q4H PRN Niecy Brownlee MD   aluminum-magnesium hydroxide-simethicone 30 mL Oral Q6H PRN Niecy Brownlee MD   amLODIPine 5 mg Oral Daily Niecy Brownlee MD   aspirin 81 mg Oral Daily Niecy Brownlee MD   diazepam 5 mg Intravenous HS Niecy Brownlee MD   enoxaparin 40 mg Subcutaneous Daily Niecy Brownlee MD   meclizine 25 mg Oral Q8H [facility] Niecy Brownlee MD   metoprolol tartrate 75 mg Oral Q12H [facility] Niecy Brownlee MD   ondansetron 4 mg Intravenous Q6H PRN Niecy Brownlee MD   pravastatin 40 mg Oral After Dinner Niecy Brownlee MD   quinapril 20 mg Oral Daily Phoebe English MD        Today, Patient Was Seen By: DANIEL Chavez    ** Please Note: Dictation voice to text software may have been used in the creation of this document   **

## 2018-04-20 NOTE — ASSESSMENT & PLAN NOTE
Recent history of hernia repair trocar site left abd with midline trocar site healed no tenderness although pt does have some epigastric tenderness with palpation   Ventral hernia repair 3/2/18

## 2018-04-20 NOTE — ASSESSMENT & PLAN NOTE
Currently resolved, now with elevated troponins     Reports that he had stress test in November 2017-was told it was normal  Follows with cardiologist at [facility]  troponin trending upward last 0 55 will chk one more   fasting lipids stable

## 2018-04-20 NOTE — CONSULTS
Consultation - Cardiology Team One  Isabel Bledsoe 72 y o  male MRN: [id number]  Unit/Bed#: CW2 210-02 Encounter: [id number]    Inpatient consult to Cardiology  Consult performed by: Prasanna Boo  Consult ordered by: Candace Engle          Physician Requesting Consult: Sanjuana Ulloa DO     Reason for Consult / Principal Problem: elevated troponin    History of Present Illness      HPI: Isabel Bledsoe is a 72y o  year old male who has a history of CAD with prior multiple stents and CABG x5 in 2000 (no cath or stents since bypass), HTN, HLD, hx of colon CA,  former smoker  He follows with cardiologist Dr Joaquin Hogan  Pt presented to ED yesterday after waking up at about 1am on 4/19 with sudden onset of dizziness/vertigo; felt as if room spinning around him  He got nauseous and diaphoretic; then developed mid-sternal chest pain  If occurred while sitting up in bed  The pain felt like a tightness without radiation  He called EMS  He was given ASA, and SL Nitroglycerin in ambulance with improvement in his pain  Valium helped his vertigo  The pain was very mild by the time he arrived to ED  Nitropaste was applied and pain resolved quickly  He has not had any recurrent chest pain  His presenting EKG showed sinus rhythm with RBBB and non-specific twave abnormalities  Initial troponin at 7:30am yesterday was 0 02  He was normotensive, afebrile and SPO2 93% on RA  CT head showed cerebral atrophy with chronic small vessel ischemic white matter disease and chronic lacunar infarcts  No prior hx of CVA  His vertigo is improved with meclizine and valium  Repeat troponin done at 1800 yesterday was 0 32, serial checks overnight continue to be elevated 0 33, 0 34, 0 55  A cardiology consultation was requested regarding the elevated troponin       Pt has hx of CAD with prior stenting all done at [facility] and then CABG x5 in 2000 again at [facility], pt reports a prolonged hospitalization and difficult recovery post     He follows with Dr Miguel Alvarenga; I do have 1 office note done 2/2018 for pre-op clearance that notes he has normal LV function; and that he had a nuclear stress test 1/2018 that showed "inferolateral scarring with some reperfusion, no change since 2014"    Pt remains on ASA, statin, beta blocker, ACE and norvasc  He denies frequent chest pain but does admit to having multiple episodes in last year of very brief chest tightness with activity such as working on tractors  He does not do any regular exercise but does do machinery repair on a farm  He does get quite winding walking [place] but notes this has not changed in past 6 months  He can go up a full flight pf stairs without any symptoms  He had a hernia repair 9/9618 without complications  Former smoker, quit 30 years ago  Denies hx of DM  Review of Systems   Constitution: Negative for decreased appetite, fever and weakness  Cardiovascular: Negative for chest pain, dyspnea on exertion, leg swelling, orthopnea, palpitations and syncope  Respiratory: Negative for cough, shortness of breath and wheezing  Musculoskeletal: Negative for back pain  Gastrointestinal: Negative for abdominal pain, nausea and vomiting  Genitourinary: Negative for dysuria  Neurological: Positive for vertigo  Psychiatric/Behavioral: Negative for altered mental status  All other systems reviewed and are negative       Historical Information   Past Medical History:   Diagnosis Date    Cancer ([facility] [address] )     colon    Hyperlipidemia     Hypertension     Myocardial infarction ([facility] [address] ) 2000    Ventral hernia      Past Surgical History:   Procedure Laterality Date    COLON SURGERY      colon resection    CORONARY ARTERY BYPASS GRAFT      x5    OR REPAIR INCISIONAL HERNIA,REDUCIBLE N/A 3/2/2018    Procedure: REPAIR HERNIA VENTRAL WITH MESH;  Surgeon: Tasia Malone MD;  Location: [facility];  Service: General History   Alcohol Use    7 2 oz/week    12 Cans of beer per week     History   Drug use: Unknown     History   Smoking Status    Former Smoker    Quit date: 1986   Smokeless Tobacco    Never Used     Family History: History reviewed  No pertinent family history  Meds/Allergies   current meds:   Current Facility-Administered Medications   Medication Dose Route Frequency     EMS REPLENISHMENT MED   Does not apply Once    acetaminophen (TYLENOL) tablet 650 mg  650 mg Oral Q4H PRN    aluminum-magnesium hydroxide-simethicone (MYLANTA) 200-200-20 mg/5 mL oral suspension 30 mL  30 mL Oral Q6H PRN    amLODIPine (NORVASC) tablet 5 mg  5 mg Oral Daily    aspirin (ECOTRIN LOW STRENGTH) EC tablet 81 mg  81 mg Oral Daily    diazepam (VALIUM) injection 5 mg  5 mg Intravenous HS    enoxaparin (LOVENOX) subcutaneous injection 40 mg  40 mg Subcutaneous Daily    meclizine (ANTIVERT) tablet 25 mg  25 mg Oral Q8H [address]    metoprolol tartrate (LOPRESSOR) tablet 75 mg  75 mg Oral Q12H GUANACO    ondansetron (ZOFRAN) injection 4 mg  4 mg Intravenous Q6H PRN    pravastatin (PRAVACHOL) tablet 40 mg  40 mg Oral After Dinner    quinapril (ACCUPRIL) tablet 20 mg  20 mg Oral Daily     Allergies   Allergen Reactions    Penicillins Shortness Of Breath and Rash     Objective   Vitals: Blood pressure 134/66, pulse 68, temperature 98 3 °F (36 8 °C), temperature source Oral, resp  rate 16, height 5' 9" (1 753 m), weight 88 5 kg (195 lb), SpO2 90 %  ,     Body mass index is 28 8 kg/m²  ,     Systolic (96QBA), QLS:178 , Min:122 , BUW:417     Diastolic (57XYV), PDJ:80, Min:62, Max:78      Intake/Output Summary (Last 24 hours) at 04/20/18 0905  Last data filed at 04/20/18 5797   Gross per 24 hour   Intake             1180 ml   Output              400 ml   Net              780 ml     Weight (last 2 days)     Date/Time   Weight    04/19/18 1735  88 5 (195)    04/19/18 0733  88 5 (195)            Invasive Devices     Peripheral Intravenous Line            Peripheral IV 04/19/18 Right Antecubital less than 1 day              Physical Exam   Constitutional: He is oriented to person, place, and time  No distress  Pt sitting up in bed in NAD, alert, pleasant and cooperative   HENT:   Head: Normocephalic and atraumatic  Neck: No JVD present  Carotid bruit is not present  Cardiovascular: Normal rate, regular rhythm, S1 normal and S2 normal     No murmur heard  No LE edema  No TTP chest wall   Pulmonary/Chest: Effort normal and breath sounds normal  No respiratory distress  He has no wheezes  He has no rales  Abdominal: Soft  Musculoskeletal: He exhibits no edema  Neurological: He is alert and oriented to person, place, and time  Skin: Skin is warm and dry  He is not diaphoretic  Psychiatric: He has a normal mood and affect  His behavior is normal    Nursing note and vitals reviewed      LABORATORY RESULTS:    Results from last 7 days  Lab Units 04/20/18  0432 04/19/18  2356 04/19/18  2051   TROPONIN I ng/mL 0 55* 0 34* 0 33*     CBC with diff:   Results from last 7 days  Lab Units 04/20/18  0432 04/19/18  1806 04/19/18  0720   WBC Thousand/uL 8 22  --  4 61   HEMOGLOBIN g/dL 13 1  --  14 3   HEMATOCRIT % 38 7  --  42 0   MCV fL 103*  --  103*   PLATELETS Thousands/uL 164 172 152   MCH pg 34 8*  --  35 2*   MCHC g/dL 33 9  --  34 0   RDW % 14 0  --  13 5   MPV fL 10 2 9 7 10 1   NRBC AUTO /100 WBCs 0  --  0     CMP:  Results from last 7 days  Lab Units 04/20/18  0432 04/19/18  0720   SODIUM mmol/L 140 139   POTASSIUM mmol/L 3 9 5 0   CHLORIDE mmol/L 106 107   CO2 mmol/L 28 24   ANION GAP mmol/L 6 8   BUN mg/dL 11 9   CREATININE mg/dL 0 78 0 84   GLUCOSE RANDOM mg/dL 113 159*   CALCIUM mg/dL 8 9 9 0   AST U/L  --  59*   ALT U/L  --  49   ALK PHOS U/L  --  66   TOTAL PROTEIN g/dL  --  7 9   BILIRUBIN TOTAL mg/dL  --  0 84   EGFR ml/min/1 73sq m 91 88     BMP:  Results from last 7 days  Lab Units 04/20/18  0432 04/19/18  0720   SODIUM mmol/L 140 139   POTASSIUM mmol/L 3 9 5 0   CHLORIDE mmol/L 106 107   CO2 mmol/L 28 24   BUN mg/dL 11 9   CREATININE mg/dL 0 78 0 84   GLUCOSE RANDOM mg/dL 113 159*   CALCIUM mg/dL 8 9 9 0     Lab Results   Component Value Date    NTBNP 155 (H) 04/19/2018        Results from last 7 days  Lab Units 04/20/18  0432 04/19/18  0720   MAGNESIUM mg/dL 2 3 2 1         Lipid Profile:   Lab Results   Component Value Date    CHOL 97 04/20/2018     Lab Results   Component Value Date    HDL 41 04/20/2018     Lab Results   Component Value Date    LDLCALC 37 04/20/2018     Lab Results   Component Value Date    TRIG 97 04/20/2018     Imaging: I have personally reviewed pertinent reports  Xr Chest 2 Views    Result Date: 4/19/2018  Narrative: CHEST INDICATION:   chest pain  COMPARISON:  03/08/2012 EXAM PERFORMED/VIEWS:  XR CHEST PA & LATERAL Images: 3 FINDINGS: Cardiomediastinal silhouette appears enlarged  A median sternotomy has been performed  The lungs are clear  No pneumothorax or pleural effusion  No evidence of heart failure  Osseous structures appear within normal limits for patient age  Impression: No acute cardiopulmonary disease  Cardiomegaly  Findings concur with the preliminary report by the referring clinician already in PACS and/or our electronic record EPIC  Workstation performed: [id number]     Ct Head Without Contrast    Result Date: 4/19/2018  Narrative: CT BRAIN - WITHOUT CONTRAST INDICATION:   dizzy  Severe dizziness  Nausea  COMPARISON:  None  TECHNIQUE:  CT examination of the brain was performed  In addition to axial images, coronal 2D reformatted images were created and submitted for interpretation  Radiation dose length product (DLP) for this visit:  993 26 mGy-cm   This examination, like all CT scans performed in the [facility], was performed utilizing techniques to minimize radiation dose exposure, including the use of iterative  reconstruction and automated exposure control    IMAGE QUALITY:  Diagnostic  FINDINGS: PARENCHYMA: Decreased attenuation is noted in periventricular and subcortical white matter demonstrating an appearance that is statistically most likely to represent moderate microangiopathic change  Chronic lacunar infarction(s) are noted in basal ganglia  No CT signs of acute infarction  No intracranial mass, mass effect or midline shift  No acute parenchymal hemorrhage  Atherosclerotic vascular calcifications in carotid and vertebral arteries are more advanced than would be expected for the patient's  age  VENTRICLES AND EXTRA-AXIAL SPACES:  Enlargement of ventricles and extra-axial CSF spaces, out of proportion to the patient's age most consistent with cerebral and cerebellar atrophy  VISUALIZED ORBITS AND PARANASAL SINUSES:  Unremarkable  CALVARIUM AND EXTRACRANIAL SOFT TISSUES:  Normal      Impression: Cerebral atrophy with chronic small vessel ischemic white matter disease and chronic lacunar infarctions  No acute intracranial abnormality  Workstation performed: [id number]     EKG reviewed personally:   4/18/2018  Sinus rhythm  RBBB  Non-specific twave abnormalities    Telemetry reviewed personally:   Sinus rhythm, brief 4 beats NSVT    Assessment  Principal Problem:    Dizziness  Active Problems:    Precordial pain    Hx of CABG    Essential hypertension    HLD (hyperlipidemia)      Assessment/ Plan    NSTEMI: presented trop 0 02; currently 0 91; pt had chest pain prior to arrival in ED; no pain since  Troponin continues to trend up    Recommend cardiac catheterization; will need to discuss with patient when he returns from MRI   - Start IV heparin   - Continue ASA, statin, BB; give additional 243 ASA this AM for full dose   - Renal function WNL, start IVF    Hx of CAD: with prior stenting and CABG x5; unknown anatomy; all procedures at [facility]    Continue ASA, statin, BB  Records requested    HTN: BP control adequate    HLD: continue statin    Vertigo/abnormal CT head: getting MRI head and neuro consult pending    Thank you for allowing us to participate in this patient's care  This pt will follow up with Dr Katerin Segura once discharged  Counseling / Coordination of Care  Total floor / unit time spent today 45 minutes  Greater than 50% of total time was spent with the patient and / or family counseling and / or coordination of care  A description of the counseling / coordination of care: Review of history, current assessment, development of a plan  Code Status: Level 1 - Full Code    ** Please Note: Dragon 360 Dictation voice to text software may have been used in the creation of this document   **

## 2018-04-20 NOTE — ASSESSMENT & PLAN NOTE
Unsure etiology possibly BPPV- given history of vascular disease/chronic lacunar infarctions rule out posterior circulation stroke  Will need to rlo cva tia   Will consult neuro, will discuss need for pathway and possible cta head neck     Supportive care  Check MRI brain : pending will require greater than 2 midnight stay   Meclizine prn   Hydration  Outpatient vestibular therapy, if deemed vestibular will consult neuro for ongoing workup at this time since, not on stroke pathway   Continue aspirin/statin   Ct head demonstrates Cerebral atrophy with chronic small vessel ischemic white matter disease and chronic lacunar infarctions  No acute intracranial abnormality    Ekg: Normal sinus rhythm  Left axis deviation  Right bundle branch block  Possible Lateral infarct (cited on or before 19-APR-2018)  Abnormal ECG

## 2018-04-21 VITALS
RESPIRATION RATE: 18 BRPM | SYSTOLIC BLOOD PRESSURE: 127 MMHG | DIASTOLIC BLOOD PRESSURE: 74 MMHG | WEIGHT: 194 LBS | HEIGHT: 68 IN | BODY MASS INDEX: 29.4 KG/M2 | HEART RATE: 70 BPM | OXYGEN SATURATION: 94 % | TEMPERATURE: 98 F

## 2018-04-21 PROBLEM — R42 DIZZINESS: Status: RESOLVED | Noted: 2018-04-19 | Resolved: 2018-04-21

## 2018-04-21 LAB
ANION GAP SERPL CALCULATED.3IONS-SCNC: 5 MMOL/L (ref 4–13)
BASOPHILS # BLD AUTO: 0.03 THOUSANDS/ΜL (ref 0–0.1)
BASOPHILS NFR BLD AUTO: 1 % (ref 0–1)
BUN SERPL-MCNC: 10 MG/DL (ref 5–25)
CALCIUM SERPL-MCNC: 8.5 MG/DL (ref 8.3–10.1)
CHLORIDE SERPL-SCNC: 107 MMOL/L (ref 100–108)
CO2 SERPL-SCNC: 28 MMOL/L (ref 21–32)
CREAT SERPL-MCNC: 0.78 MG/DL (ref 0.6–1.3)
EOSINOPHIL # BLD AUTO: 0.13 THOUSAND/ΜL (ref 0–0.61)
EOSINOPHIL NFR BLD AUTO: 3 % (ref 0–6)
ERYTHROCYTE [DISTWIDTH] IN BLOOD BY AUTOMATED COUNT: 13.8 % (ref 11.6–15.1)
GFR SERPL CREATININE-BSD FRML MDRD: 91 ML/MIN/1.73SQ M
GLUCOSE SERPL-MCNC: 107 MG/DL (ref 65–140)
GLUCOSE SERPL-MCNC: 131 MG/DL (ref 65–140)
HCT VFR BLD AUTO: 39.7 % (ref 36.5–49.3)
HGB BLD-MCNC: 13 G/DL (ref 12–17)
LYMPHOCYTES # BLD AUTO: 1.68 THOUSANDS/ΜL (ref 0.6–4.47)
LYMPHOCYTES NFR BLD AUTO: 33 % (ref 14–44)
MAGNESIUM SERPL-MCNC: 2.2 MG/DL (ref 1.6–2.6)
MCH RBC QN AUTO: 34.2 PG (ref 26.8–34.3)
MCHC RBC AUTO-ENTMCNC: 32.7 G/DL (ref 31.4–37.4)
MCV RBC AUTO: 105 FL (ref 82–98)
MONOCYTES # BLD AUTO: 0.53 THOUSAND/ΜL (ref 0.17–1.22)
MONOCYTES NFR BLD AUTO: 11 % (ref 4–12)
NEUTROPHILS # BLD AUTO: 2.66 THOUSANDS/ΜL (ref 1.85–7.62)
NEUTS SEG NFR BLD AUTO: 52 % (ref 43–75)
NRBC BLD AUTO-RTO: 0 /100 WBCS
PLATELET # BLD AUTO: 149 THOUSANDS/UL (ref 149–390)
PMV BLD AUTO: 10 FL (ref 8.9–12.7)
POTASSIUM SERPL-SCNC: 3.8 MMOL/L (ref 3.5–5.3)
RBC # BLD AUTO: 3.8 MILLION/UL (ref 3.88–5.62)
SODIUM SERPL-SCNC: 140 MMOL/L (ref 136–145)
WBC # BLD AUTO: 5.03 THOUSAND/UL (ref 4.31–10.16)

## 2018-04-21 PROCEDURE — 82948 REAGENT STRIP/BLOOD GLUCOSE: CPT

## 2018-04-21 PROCEDURE — 80048 BASIC METABOLIC PNL TOTAL CA: CPT | Performed by: NURSE PRACTITIONER

## 2018-04-21 PROCEDURE — 85025 COMPLETE CBC W/AUTO DIFF WBC: CPT | Performed by: NURSE PRACTITIONER

## 2018-04-21 PROCEDURE — 99232 SBSQ HOSP IP/OBS MODERATE 35: CPT | Performed by: INTERNAL MEDICINE

## 2018-04-21 PROCEDURE — 99239 HOSP IP/OBS DSCHRG MGMT >30: CPT | Performed by: NURSE PRACTITIONER

## 2018-04-21 PROCEDURE — 83735 ASSAY OF MAGNESIUM: CPT | Performed by: INTERNAL MEDICINE

## 2018-04-21 RX ORDER — MECLIZINE HYDROCHLORIDE 25 MG/1
25 TABLET ORAL EVERY 8 HOURS SCHEDULED
Qty: 10 TABLET | Refills: 0 | Status: SHIPPED | OUTPATIENT
Start: 2018-04-21

## 2018-04-21 RX ORDER — NITROGLYCERIN 0.3 MG/1
0.3 TABLET SUBLINGUAL
Qty: 90 TABLET | Refills: 0 | Status: SHIPPED | OUTPATIENT
Start: 2018-04-21

## 2018-04-21 RX ADMIN — ENOXAPARIN SODIUM 40 MG: 40 INJECTION SUBCUTANEOUS at 09:50

## 2018-04-21 RX ADMIN — ASPIRIN 81 MG: 81 TABLET, COATED ORAL at 09:50

## 2018-04-21 RX ADMIN — MECLIZINE HYDROCHLORIDE 25 MG: 25 TABLET ORAL at 13:57

## 2018-04-21 RX ADMIN — METOPROLOL TARTRATE 75 MG: 50 TABLET ORAL at 09:50

## 2018-04-21 RX ADMIN — QUINAPRIL 20 MG: 20 TABLET ORAL at 13:56

## 2018-04-21 RX ADMIN — MECLIZINE HYDROCHLORIDE 25 MG: 25 TABLET ORAL at 05:07

## 2018-04-21 RX ADMIN — AMLODIPINE BESYLATE 5 MG: 5 TABLET ORAL at 09:51

## 2018-04-21 NOTE — PROGRESS NOTES
Heart Failure Service Progress Note - Rosalind Earing 72 y o  male MRN: [id number]    Unit/Bed#: [facility] 721-01 Encounter: [id number]      Assessment:    Active Problems:    Precordial pain    Hx of CABG    Essential hypertension    HLD (hyperlipidemia)    H/O hernia repair  Pt presented with dizziness and vertigo  Consulted for SR, RBBB and trop 0 55    # CAD with hx of multiple stents and CABG x 5 in 2000 (nothing since)  Cath 4/20: diffuse CAD; occluded graft to RI, occluded graft to OM1, moderate dz of graft to RPDA, LIMA patent  # HTN  # HLD  # former smoker    Plan:  Med Rx for diffuse CAD- metoprolol tartrate 75 mg BID, pravachol 40 mg daily, asaa 81 mg daily, amlodipine 5 mg  Should go home with sl nitro script  Follow up with his primary cardiologist  Can go home    Subjective:   Patient seen and examined  No significant events overnight  Negative  no chest pain    Objective: Intake/ Output:  Weight:   Tele:     [employer] (day, reason): Yancey catheter (day, reason):    Vitals: Blood pressure 127/74, pulse 70, temperature 98 °F (36 7 °C), temperature source Oral, resp  rate 18, height 5' 8" (1 727 m), weight 88 kg (194 lb 0 1 oz), SpO2 94 %  , Body mass index is 29 5 kg/m² , I/O last 3 completed shifts: In: 591 [P O :765]  Out: 1000 [Urine:1000]  I/O this shift: In: 800 [P O :800]  Out: 400 [Urine:400]  Wt Readings from Last 3 Encounters:   04/21/18 88 kg (194 lb 0 1 oz)   04/19/18 88 5 kg (195 lb 1 7 oz)   03/02/18 88 5 kg (195 lb)       Intake/Output Summary (Last 24 hours) at 04/21/18 1335  Last data filed at 04/21/18 1158   Gross per 24 hour   Intake             1025 ml   Output             1000 ml   Net               25 ml     I/O last 3 completed shifts: In: 189 [P O :765]  Out: 1000 [Urine:1000]    No significant arrhythmias seen on telemetry review         Physical Exam:  Vitals:    04/21/18 0759 04/21/18 1105 04/21/18 1109 04/21/18 1111   BP: 120/77 118/64 132/75 127/74   BP Location: Left arm Left arm Left arm Left arm   Pulse: 63 67 67 70   Resp: 18 18 18 18   Temp: 98 °F (36 7 °C) 98 °F (36 7 °C) 98 °F (36 7 °C) 98 °F (36 7 °C)   TempSrc: Oral Oral Oral Oral   SpO2: 95% 93% 94% 94%   Weight:       Height:           GEN: Zoila Liriano appears well, alert and oriented x 3, pleasant and cooperative   HEENT: pupils equal, round, and reactive to light; extraocular muscles intact  NECK: supple, no carotid bruits   HEART: regular rhythm, normal S1 and S2, no murmurs, clicks, gallops or rubs, JVP is    LUNGS: clear to auscultation bilaterally; no wheezes, rales, or rhonchi   ABDOMEN: normal bowel sounds, soft, no tenderness, no distention  EXTREMITIES: peripheral pulses normal; no clubbing, cyanosis, or edema  NEURO: no focal findings   SKIN: normal without suspicious lesions on exposed skin      Current Facility-Administered Medications:      EMS REPLENISHMENT MED, , Does not apply, Once, Triage Protocol Emergency, MD    acetaminophen (TYLENOL) tablet 650 mg, 650 mg, Oral, Q4H PRN, Niecy Brownlee MD    aluminum-magnesium hydroxide-simethicone (MYLANTA) 200-200-20 mg/5 mL oral suspension 30 mL, 30 mL, Oral, Q6H PRN, Niecy Brownlee MD    amLODIPine (NORVASC) tablet 5 mg, 5 mg, Oral, Daily, Niecy Brownlee MD, 5 mg at 04/21/18 0951    aspirin (ECOTRIN LOW STRENGTH) EC tablet 81 mg, 81 mg, Oral, Daily, Niecy Brownlee MD, 81 mg at 04/21/18 0950    diazepam (VALIUM) injection 5 mg, 5 mg, Intravenous, HS, Niecy Brownlee MD, 5 mg at 04/20/18 2116    enoxaparin (LOVENOX) subcutaneous injection 40 mg, 40 mg, Subcutaneous, Daily, Niecy Brownlee MD, 40 mg at 04/21/18 0950    meclizine (ANTIVERT) tablet 25 mg, 25 mg, Oral, Q8H [address], Niecy Brownlee MD, 25 mg at 04/21/18 0507    metoprolol tartrate (LOPRESSOR) tablet 75 mg, 75 mg, Oral, Q12H [address], Niecy Brownlee MD, 75 mg at 04/21/18 0950    ondansetron (ZOFRAN) injection 4 mg, 4 mg, Intravenous, Q6H PRN, Niecy Brownlee MD    pravastatin (PRAVACHOL) tablet 40 mg, 40 mg, Oral, After Dinner, Niecy Brownlee MD, 40 mg at 04/20/18 1758    quinapril (ACCUPRIL) tablet 20 mg, 20 mg, Oral, Daily, Niecy Brownlee MD, 20 mg at 04/20/18 0855    sodium chloride 0 9 % infusion, 75 mL/hr, Intravenous, Continuous, [place], CRNP, Stopped at 04/20/18 1807      Labs & Results:      Results from last 7 days  Lab Units 04/20/18  0858 04/20/18  0432 04/19/18  2356   TROPONIN I ng/mL 0 91* 0 55* 0 34*     Results from last 7 days  Lab Units 04/21/18  0536 04/20/18  1355 04/20/18  0432   WBC Thousand/uL 5 03 8 39 8 22   HEMOGLOBIN g/dL 13 0 13 8 13 1   HEMATOCRIT % 39 7 39 5 38 7   PLATELETS Thousands/uL 149 172 164       Results from last 7 days  Lab Units 04/20/18  0432   CHOLESTEROL mg/dL 97       Results from last 7 days  Lab Units 04/21/18  0536 04/20/18  0432 04/19/18  0720   SODIUM mmol/L 140 140 139   POTASSIUM mmol/L 3 8 3 9 5 0   CHLORIDE mmol/L 107 106 107   CO2 mmol/L 28 28 24   BUN mg/dL 10 11 9   CREATININE mg/dL 0 78 0 78 0 84   CALCIUM mg/dL 8 5 8 9 9 0   TOTAL PROTEIN g/dL  --   --  7 9   BILIRUBIN TOTAL mg/dL  --   --  0 84   ALK PHOS U/L  --   --  66   ALT U/L  --   --  49   AST U/L  --   --  59*   GLUCOSE RANDOM mg/dL 107 113 159*     Results from last 7 days  Lab Units 04/20/18  1355   INR  1 28*         EKG personally reviewed by Harley Gerber DO  Counseling / Coordination of Care  Total floor / unit time spent today 25 minutes  Greater than 50% of total time was spent with the patient and / or family counseling and / or coordination of care  A description of the counseling / coordination of care: 15      Thank you for the opportunity to participate in the care of this patient  Harley TREJO    Director Heart Failure/ Medical Director [address] [place] [address]

## 2018-04-21 NOTE — DISCHARGE INSTRUCTIONS
Please call to schedule follow-up with the Neurology team will at soonest available appointment  Please call to schedule follow-up with your primary care physician within the next week  Please call to schedule follow-up with your cardiologist    Chest Pain, Ambulatory Care   GENERAL INFORMATION:   Chest pain  can be caused by a range of conditions, from not serious to life-threatening  It may be caused by a heart attack or a blood clot in your lungs  Sometimes chest pain or pressure is caused by poor blood flow to your heart (angina)  Infection, inflammation, or a fracture in the bones or cartilage in your chest can cause pain or discomfort  Chest pain can also be a symptom of a digestive problem, such as acid reflux or a stomach ulcer  Common symptoms include the following:   · Fever or sweating     · Nausea or vomiting     · Shortness of breath     · Discomfort or pressure that spreads from your chest to your back, jaw, or arm     · A racing or slow heartbeat     · Feeling weak, tired, or faint  Seek immediate care for the following symptoms:   · Any of the following signs of a heart attack:      ¨ Squeezing, pressure, or pain in your chest that lasts longer than 5 minutes or returns    ¨ Discomfort or pain in your back, neck, jaw, stomach, or arm     ¨ Trouble breathing     ¨ Nausea or vomiting    ¨ Lightheadedness or a sudden cold sweat, especially with trouble breathing         · Chest discomfort that gets worse, even with medicine    · Coughing or vomiting blood    · Black or bloody bowel movements     · Vomiting that does not stop, or pain when you swallow  Treatment for chest pain  may include medicine to treat your symptoms while he determines the cause of your chest pain  You may also need any of the following:  · Antiplatelets , such as aspirin, help prevent blood clots  Take your antiplatelet medicine exactly as directed  These medicines make it more likely for you to bleed or bruise   If you are told to take aspirin, do not take acetaminophen or ibuprofen instead  · Prescription pain medicine  may be given  Ask how to take this medicine safely  Do not smoke: If you smoke, it is never too late to quit  Smoking increases your risk for a heart attack and other heart and lung conditions  Ask your healthcare provider for information about how to stop smoking if you need help  Follow up with your healthcare provider as directed: You may need more tests  You may be referred to a specialist, such as a cardiologist or gastroenterologist  Write down your questions so you remember to ask them during your visits  CARE AGREEMENT:   You have the right to help plan your care  Learn about your health condition and how it may be treated  Discuss treatment options with your caregivers to decide what care you want to receive  You always have the right to refuse treatment  The above information is an  only  It is not intended as medical advice for individual conditions or treatments  Talk to your doctor, nurse or pharmacist before following any medical regimen to see if it is safe and effective for you  © 2014 [address] is for End User's use only and may not be sold, redistributed or otherwise used for commercial purposes  All illustrations and images included in CareNotes® are the copyrighted property of [employer]  or Lionel Mantilla  Dizziness   AMBULATORY CARE:   Dizziness  is a feeling of being off balance or unsteady  Common causes of dizziness are an inner ear fluid imbalance or a lack of oxygen in your blood  Dizziness may be acute (lasts 3 days or less) or chronic (lasts longer than 3 days)  You may have dizzy spells that last from seconds to a few hours     Common symptoms that may happen with dizziness:   · A feeling that your surroundings are moving even though you are standing still    · Ringing in your ears or hearing loss     · Feeling faint or lightheaded     · Weakness or unsteadiness     · Double vision or eye movements you cannot control    · Nausea or vomiting     · Confusion  Seek care immediately if:   · You have a headache and a stiff neck  · You have shaking chills and a fever  · You vomit over and over with no relief  · Your vomit or bowel movements are red or black  · You have pain in your chest, back, or abdomen  · You have numbness, especially in your face, arms, or legs  · You have trouble moving your arms or legs  · You are confused  Contact your healthcare provider if:   · You have a fever  · Your symptoms do not get better with treatment  · You have questions or concerns about your condition or care  Treatment for dizziness  depends on the cause  Your healthcare provider may give you oxygen or medicines to decrease your dizziness and nausea  He may also refer you to a specialist  Rashida Aviles may need to be admitted to the hospital for treatment  Manage your symptoms:   · Do not drive  or operate heavy machinery when you are dizzy  · Get up slowly  from sitting or lying down  · Drink plenty of liquids  Liquids help prevent dehydration  Ask how much liquid to drink each day and which liquids are best for you  Follow up with your healthcare provider as directed:  Write down your questions so you remember to ask them during your visits  © 2017 [address] Information is for End User's use only and may not be sold, redistributed or otherwise used for commercial purposes  All illustrations and images included in CareNotes® are the copyrighted property of [employer]  or Lionel Mantilla  The above information is an  only  It is not intended as medical advice for individual conditions or treatments  Talk to your doctor, nurse or pharmacist before following any medical regimen to see if it is safe and effective for you        Effects of a Stroke   WHAT YOU NEED TO KNOW:   The effects of a stroke may be different for everyone  They may depend on where the stroke happened in your brain and how much damage occurred there  Some people may make a full recovery  Other people may have long-term effects  It is important to talk to your healthcare provider about any symptoms you have after a stroke  There are medicines and therapies to help manage the effects of a stroke  DISCHARGE INSTRUCTIONS:   Call 911 or have someone else call 911 for any of the following:   · You have any of the following signs of a stroke:      ¨ Numbness or drooping on one side of your face     ¨ Weakness in an arm or leg    ¨ Confusion or difficulty speaking    ¨ Dizziness, a severe headache, or vision loss    ·            · You cannot be woken up  · You fall and hit your head  · You have a seizure  · You feel lightheaded, short of breath, and have chest pain  · You cough up blood  Seek care immediately if:   · Your arm or leg is painful, red, or larger than normal      · You feel weak, dizzy, or faint  · You have a fall without hitting your head  · Your blood sugar level or blood pressure is higher or lower than your healthcare provider said it should be  · You bleed heavily or cannot stop bleeding from a cut or injury  · You have a new, severe headache  Contact your healthcare provider if:   · You have a fever  · You have a rash  · You have new or worsening symptoms  · You feel extremely sad or anxious  · You feel you are unable to cope with your condition  · You have trouble sleeping  · You have open sores  · You choke or cough when eating or drinking  · You have questions or concerns about your condition or care    Problems with language, speech, and memory:   · Difficulty finding the right words or putting complete sentences together    · Difficulty putting words together that make sense    · Difficulty paying attention or a short attention span    · Difficulty writing or reading    · Problems with memory or being disoriented to time, place, or situation    · Problems thinking clearly or feeling confused    · Difficulty understanding written or spoken language or learning something new  Muscle and nerve problems:  A stroke may affect one side of your body or part of one side  You may be at an increased risk for falling if you have difficulty moving your leg muscles   You may have any of the following:  · Inability to move your arm, leg, or one side of your face (paralysis)    · Muscle weakness, spasms, or muscles that stay in one position (contracted)    · Poor balance, difficulty walking, or difficulty grasping objects    · Changes in your vision or poor vision    · Ignoring, or being unaware of one side of your body    · Numbness of your arm, hand, fingers, leg, foot, or toes    · Pain, tickling, or prickling in weak or paralyzed parts of your body  Bowel and bladder problems:   · Loss of control of your urine or bowel movements    · Feeling like you have to urinate frequently, even when your bladder is not full    · Difficulty emptying your bladder or constipation  Swallowing and eating problems:   · Difficulty swallowing food    · Difficulty feeding yourself    · A lack of taste or a change in the way you taste things    · An increased risk for aspiration (food moves into the lungs) and pneumonia due to swallowing problems  Changes in personality or mood:   · Depression, sadness, irritability, or hopelessness    · Anger, frustration, or anxiety    · Difficulty controlling emotions or expressing inappropriate emotions    · Quick mood changes  Fatigue:   · Low energy levels    · Tiring easily    · A lack of motivation  Problems sleeping:   · Development of sleep apnea    · Changes in sleep such as sleeping too much or not enough  Problems with sexual function:   · Difficulty having or keeping an erection    · Vaginal dryness    · A decreased interest in sex  Self care after a stroke:   · Go to rehabilitation (rehab) as directed  Rehab is an important part of treatment  A speech therapist helps you relearn or improve your ability to talk and swallow  You may start slowly and start doing more difficult tasks over time  Physical therapists can help you gain strength and build endurance  Occupational therapists teach you new ways to do daily activities, such as getting dressed  Therapy can help you improve your ability to walk or keep your balance  Your therapy may include tasks or movements you will need to do for everyday activities  An example is being able to raise or lower yourself from a chair  · Prevent falls in your home  Remove anything you might trip over  Tape electrical cords down  Keep paths clear throughout your home  Make sure your home is well lighted  Put nonslip materials on surfaces that might be slippery  An example is your bathtub or shower floor  · Use assistive devices  A cane or walker may help you keep your balance as you walk  · Manage other medical conditions  Manage your heart conditions, blood pressure, and diabetes  Management of these conditions can reduce your risk for another stroke  Take your medicines as directed  Follow your healthcare provider's instructions for diet  · Join a support group  Life after a stroke can be difficult  It may be helpful to talk to others who have had a stroke  There are also support groups for family members or support persons of those whom have had a stroke  Ask your healthcare provider for more information about support groups  Know the signs of depression: Depression may happen after you have a stroke  Depression can lower your quality of life and cause you to stop doing things to help you be stronger  Depression can cause you to become less independent  Know the signs of depression so that you can receive help   Tell your family and friends that if they see these signs, to let your healthcare provider know  You may show any of the following signs of depression:  · Extreme sadness    · Avoiding social interaction with family or friends    · A lack of interest in things you once enjoyed    · Irritability    · Trouble sleeping    · Low energy levels    · A change in eating habits or sudden weight gain or loss  Driving after a stroke:  Do not drive a car without talking to your healthcare provider or occupational therapist  The effects of a stroke may make it difficult or unsafe for you to drive  You may need to have a  evaluation before you can safely and legally drive a car  You may also need to take a  training program or get special equipment installed in your car  Ask your healthcare provider for more information about driving after a stroke  For more information and support:   · National [employer] Association  [address] , [place] [address]  Phone: 4- 806 - 113-0771  Web Address: [employer]  Follow up with your healthcare provider as directed: You may need to come in for regular tests of your brain function  If you are taking warfarin, you will need to come in for regular blood tests  Your INR levels will also need to be checked  These tests help make sure you are taking the right amount of warfarin  Write down your questions so you remember to ask them during your visits  © 2017 [address] Information is for End User's use only and may not be sold, redistributed or otherwise used for commercial purposes  All illustrations and images included in CareNotes® are the copyrighted property of [employer]  or Lionel Mantilla  The above information is an  only  It is not intended as medical advice for individual conditions or treatments  Talk to your doctor, nurse or pharmacist before following any medical regimen to see if it is safe and effective for you

## 2018-04-21 NOTE — DISCHARGE SUMMARY
Discharge Summary - [place] [address] Internal Medicine    Patient Information: Enma Arboleda 72 y o  male MRN: [id number]  Unit/Bed#: [facility] 721-01 Encounter: [id number]    Discharging Physician / Practitioner: DANIEL Fitzpatrick  PCP: Aakash Dubois DO  Admission Date: 4/19/2018  Discharge Date: 04/21/18    Disposition:     Home    Reason for Admission: dizziness and chest pain     Discharge Diagnoses:     Principal Problem:    Dizziness  Active Problems:    H/O hernia repair    Essential hypertension    Precordial pain    Hx of CABG    HLD (hyperlipidemia)  Resolved Problems:    * No resolved hospital problems  *      Consultations During Hospital Stay:  · *Dr Rossana Aleman neurology  · Dr Misbah Mullins cardiology     Procedures Performed:     · Troponin 0 02, 0 32, 0 33, 0 34, 0 55, 0 91 consecutively  · EKG:Normal sinus rhythm  Left axis deviation  Right bundle branch block  Anterolateral infarct , age undetermined  Abnormal ECG  When compared with ECG of 19-APR-2018 07:09, (unconfirmed)  Sinus rhythm has replaced Wide QRS rhythm  · MRI brain without contrast demonstrates: 1   Numerous foci of chronic microhemorrhage in the cerebral and cerebellar hemispheres and most prominently in the jada   Pattern is nonspecific though suggestive of chronic hypertension  2   Small, 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   No evidence of acute infarct   Microangiopathic disease  Chest x-ray: No acute cardiopulmonary disease   Cardiomegaly  CT head without contrast: Cerebral atrophy with chronic small vessel ischemic white matter disease and chronic lacunar infarctions  No acute intracranial abnormality  Echocardiogram:  With EF of 65% no regional wall abnormalities  Grade 1 diastolic dysfunction  No evidence of stenosis and no pericardial effusion  Significant Findings / Test Results:     · See above    Incidental Findings:   · None    Test Results Pending at Discharge (will require follow up):    · None Outpatient Tests Requested:  · Follow-up with PCP in 1 week  · Call to schedule follow-up with your cardiologist within the next week  · Call to schedule follow-up with Neurology at soonest available appointment    Complications:  None    Hospital Course:     Corinne Persaud is a 72 y o  male patient who originally presented to the hospital on 4/19/2018 due to sudden onset dizziness and vertigo (room spinning)  Patient reports after that he walked downstairs and had further onset of diaphoresis severe chest pain and nausea  Chest pain mostly resolved status post giving aspirin and sublingual nitroglycerin ambulance  Valium helped his vertigo pain was mild when he arrived to the emergency room  Nitropaste was applied and the pain resolved quickly and patient did not have any recurrent chest pain thereafter  Upon arrival to ER initial troponin was normal less than 0 02 however subsequent troponins were elevated with a high of 0 91  Patient was also noted on CT imaging of the head to have had old infarcts MRI was pending neurology and Cardiology were consulted  Patient was placed on heparin drip with the okay from Neurology and patient went down for cardiac catheterization  Patient was noted to have significant CAD, with majority of vessels greater than 100% stenosed  Recommendations were for medical therapy  Patient was seen by Neurology secondary to CT findings and suspected BP PV  MRI was reviewed with no acute stroke noted but chronic microhemorrhages, for which patient will be required to follow up with Neurology as an outpatient Dr Edenislon Palma  Noted to be likely amyloid angiopathy with 1 brainstem chronic hemorrhage  No further workup per Neurology needed at this time  Patient at this point is medically stable for discharge and will need to continue follow-up with Cardiology, PCP and Neurology  His initial findings are most consistent with peripheral etiology vestibular neuritis versus BPPV  Patient may follow up with some vestibular training if symptoms are ongoing  Condition at Discharge: fair     Discharge Day Visit / Exam:     Subjective:  Pt is still feeling a little dizzy but much improved have discussed vestibular training if ongoing symptoms  Follow up and his medications     Vitals: Blood Pressure: 119/67 (04/21/18 0356)  Pulse: 60 (04/21/18 0356)  Temperature: 98 °F (36 7 °C) (04/21/18 0356)  Temp Source: Oral (04/21/18 0356)  Respirations: 18 (04/21/18 0356)  Height: 5' 8" (172 7 cm) (04/20/18 1615)  Weight - Scale: 88 kg (194 lb 0 1 oz) (04/21/18 0600)  SpO2: 94 % (04/21/18 0356)  Exam:   Physical Exam   Constitutional: He is oriented to person, place, and time  He appears well-developed  No distress  HENT:   Head: Normocephalic and atraumatic  Mouth/Throat: No oropharyngeal exudate  Eyes: Conjunctivae are normal  Right eye exhibits no discharge  Left eye exhibits no discharge  No scleral icterus  Neck: No JVD present  No tracheal deviation present  No thyromegaly present  Cardiovascular: Exam reveals no gallop and no friction rub  No murmur heard  Pulmonary/Chest: Effort normal  No stridor  No respiratory distress  He has no wheezes  He has no rales  He exhibits no tenderness  Abdominal: Soft  He exhibits no distension and no mass  There is no tenderness  There is no rebound and no guarding  Musculoskeletal: Normal range of motion  He exhibits no edema, tenderness or deformity  Lymphadenopathy:     He has no cervical adenopathy  Neurological: He is oriented to person, place, and time  Skin: Skin is warm  No rash noted  He is not diaphoretic  No erythema  No pallor  Psychiatric: He has a normal mood and affect  Discussion with Family: none at the bedside     Discharge instructions/Information to patient and family:   See after visit summary for information provided to patient and family        Provisions for Follow-Up Care:  See after visit summary for information related to follow-up care and any pertinent home health orders  Planned Readmission: high risk     Discharge Statement:  I spent 50 minutes discharging the patient  This time was spent on the day of discharge  I had direct contact with the patient on the day of discharge  Greater than 50% of the total time was spent examining patient, answering all patient questions, arranging and discussing plan of care with patient as well as directly providing post-discharge instructions  Additional time then spent on discharge activities  Discharge Medications:  See after visit summary for reconciled discharge medications provided to patient and family        ** Please Note: This note has been constructed using a voice recognition system **

## 2018-05-23 ENCOUNTER — OFFICE VISIT (OUTPATIENT)
Dept: NEUROLOGY | Facility: CLINIC | Age: 73
End: 2018-05-23
Payer: MEDICARE

## 2018-05-23 VITALS
BODY MASS INDEX: 30.16 KG/M2 | WEIGHT: 199 LBS | HEART RATE: 68 BPM | SYSTOLIC BLOOD PRESSURE: 138 MMHG | HEIGHT: 68 IN | DIASTOLIC BLOOD PRESSURE: 80 MMHG

## 2018-05-23 DIAGNOSIS — I68.0 CEREBRAL AMYLOID ANGIOPATHY (CODE): ICD-10-CM

## 2018-05-23 DIAGNOSIS — R42 DIZZINESS AND GIDDINESS: ICD-10-CM

## 2018-05-23 DIAGNOSIS — I10 ESSENTIAL HYPERTENSION: Primary | ICD-10-CM

## 2018-05-23 PROCEDURE — 99214 OFFICE O/P EST MOD 30 MIN: CPT | Performed by: PSYCHIATRY & NEUROLOGY

## 2018-05-23 NOTE — PROGRESS NOTES
Patient ID: Severino Nevarez is a 72 y o  male  Assessment/Plan: This is a 72year old Male who is here as a hospital follow up for dizziness, and room spinning sensation, thought to be more peripheral in etiology  CT head shows possible microhemmorhages, likely concerned for amyloid angiopathy which I reviewed  MRI brain - which I personally reviewed which showed microhemorhages, and concern for amyloid angiopathy  PLAN:  -continue with aspirin for now, but would avoid any other blood thinners in the future in light of possible amyloid angiopathy because of increased bleeding risk    -will stop Simvastatin 40mg because there is some evidence of using statins and increasing risk of ICH in patients with amyloid angiopathy    -dizziness - will place ambulatory referral for vestibular rehab    I would like for him to follow up in 6 months, I would be happy to see her sooner if the need arises  Patient/Guardian was advised to the call the office if they have any questions and concerns in the meantime  Patient/Guardian does understand that if they have any new stroke like symptoms such as facial droop on one side, weakness/paralysis on either side, speech trouble, numbness on one side, balance issues, any vision changes, or any new headache, to call 9-1-1 immediately or to proceed to the nearest ER immediately  Problem List Items Addressed This Visit     Essential hypertension - Primary    Cerebral amyloid angiopathy (CODE)    Dizziness and giddiness    Relevant Orders    Ambulatory referral to Occupational Therapy           Subjective:    HPI    This is a 73 y/o M who was here for hospital follow up  he was in the hospital for dizziness and room spinning, and then he also had a chest pain as well  His symptomatology was thought to be 2/2 peripheral etiology, likely BPPV  He had workup and he had a CT head without contrast - did not reveal any acute findings    MRI brain did show multiple chronic hemorrhages, concern for amyloid which I personally reviewed  He still has some dizziness, and room spinning  He has not started vestibular rehab yet  He is taking Aspirin 81mg PO qdaily  He has not had any new stroke like symptoms  Overall doing ok, and does not have any new concerns/questions  The following portions of the patient's history were reviewed and updated as appropriate:   He  has a past medical history of Cancer ([facility] ); Hyperlipidemia; Hypertension; Myocardial infarction ([facility] ) (2000); and Ventral hernia  He   Patient Active Problem List    Diagnosis Date Noted    Cerebral amyloid angiopathy (CODE) 05/23/2018    Dizziness and giddiness 05/23/2018    H/O hernia repair 04/20/2018    Precordial pain 04/19/2018    Hx of CABG 04/19/2018    Essential hypertension 04/19/2018    HLD (hyperlipidemia) 04/19/2018     He  has a past surgical history that includes Coronary artery bypass graft; Colon surgery; and pr repair incisional hernia,reducible (N/A, 3/2/2018)  His family history is not on file  He  reports that he quit smoking about 32 years ago  He has never used smokeless tobacco  He reports that he drinks about 7 2 oz of alcohol per week   His drug history is not on file    Current Outpatient Prescriptions   Medication Sig Dispense Refill    AMLODIPINE BESYLATE PO Take 5 mg by mouth daily      aspirin (ECOTRIN LOW STRENGTH) 81 mg EC tablet Take 81 mg by mouth daily      Calcium Carbonate-Vitamin D (CALCIUM 600+D PO) Take 1 tablet by mouth daily      meclizine (ANTIVERT) 25 mg tablet Take 1 tablet (25 mg total) by mouth every 8 (eight) hours 10 tablet 0    metoprolol tartrate (LOPRESSOR) 50 mg tablet Take 75 mg by mouth every 12 (twelve) hours      Multiple Vitamins-Iron (MULTIVITAMIN/IRON PO) Take 1 tablet by mouth daily      nitroglycerin (NITROSTAT) 0 3 mg SL tablet Place 1 tablet (0 3 mg total) under the tongue every 5 (five) minutes as needed for chest pain 90 tablet 0  Omega-3 Fatty Acids (FISH OIL) 1000 MG CPDR Take 1 capsule by mouth daily      quinapril (ACCUPRIL) 20 mg tablet Take 20 mg by mouth daily       No current facility-administered medications for this visit  Current Outpatient Prescriptions on File Prior to Visit   Medication Sig    AMLODIPINE BESYLATE PO Take 5 mg by mouth daily    aspirin (ECOTRIN LOW STRENGTH) 81 mg EC tablet Take 81 mg by mouth daily    Calcium Carbonate-Vitamin D (CALCIUM 600+D PO) Take 1 tablet by mouth daily    meclizine (ANTIVERT) 25 mg tablet Take 1 tablet (25 mg total) by mouth every 8 (eight) hours    metoprolol tartrate (LOPRESSOR) 50 mg tablet Take 75 mg by mouth every 12 (twelve) hours    Multiple Vitamins-Iron (MULTIVITAMIN/IRON PO) Take 1 tablet by mouth daily    nitroglycerin (NITROSTAT) 0 3 mg SL tablet Place 1 tablet (0 3 mg total) under the tongue every 5 (five) minutes as needed for chest pain    Omega-3 Fatty Acids (FISH OIL) 1000 MG CPDR Take 1 capsule by mouth daily    quinapril (ACCUPRIL) 20 mg tablet Take 20 mg by mouth daily    [DISCONTINUED] simvastatin (ZOCOR) 40 mg tablet Take 40 mg by mouth daily at bedtime     No current facility-administered medications on file prior to visit  He is allergic to penicillins            Objective:    Blood pressure 138/80, pulse 68, height 5' 8" (1 727 m), weight 90 3 kg (199 lb)  Physical Exam  General - alert, awake, follows commands  Speech - fluent, no dysarthria noted  skin-  no lesions  Cranial nerves: PERRL, EOMI, no facial droop noted  Motor 5/5 throughout  gait - normal  Neurological Exam      ROS:    Review of Systems   Constitutional: Positive for fatigue  Negative for appetite change and fever  HENT: Negative  Negative for hearing loss, tinnitus, trouble swallowing and voice change  Eyes: Negative  Negative for photophobia and pain  Respiratory: Negative  Negative for shortness of breath  Cardiovascular: Negative    Negative for palpitations  Gastrointestinal: Negative  Negative for nausea and vomiting  Endocrine: Negative  Negative for cold intolerance and heat intolerance  Genitourinary: Negative  Negative for dysuria, frequency and urgency  Musculoskeletal: Negative  Negative for myalgias and neck pain  Skin: Negative  Negative for rash  Neurological: Positive for dizziness  Negative for tremors, seizures, syncope, facial asymmetry, speech difficulty, weakness, light-headedness, numbness and headaches  Hematological: Negative  Does not bruise/bleed easily  Psychiatric/Behavioral: Negative  Negative for confusion, hallucinations and sleep disturbance

## 2018-06-05 ENCOUNTER — TELEPHONE (OUTPATIENT)
Dept: NEUROLOGY | Facility: CLINIC | Age: 73
End: 2018-06-05

## 2018-06-05 DIAGNOSIS — R42 DIZZINESS AND GIDDINESS: Primary | ICD-10-CM

## 2018-06-05 NOTE — TELEPHONE ENCOUNTER
Edith Byrd from physical therapy at [address] [place] [address] requesting updated script- needs to be changed from OT to PT- for vestibular therapy  Thank you

## 2018-06-13 ENCOUNTER — EVALUATION (OUTPATIENT)
Dept: PHYSICAL THERAPY | Facility: CLINIC | Age: 73
End: 2018-06-13
Payer: MEDICARE

## 2018-06-13 DIAGNOSIS — R42 DIZZINESS AND GIDDINESS: Primary | ICD-10-CM

## 2018-06-13 PROCEDURE — G8981 BODY POS CURRENT STATUS: HCPCS | Performed by: PHYSICAL THERAPIST

## 2018-06-13 PROCEDURE — 97162 PT EVAL MOD COMPLEX 30 MIN: CPT | Performed by: PHYSICAL THERAPIST

## 2018-06-13 PROCEDURE — 97140 MANUAL THERAPY 1/> REGIONS: CPT | Performed by: PHYSICAL THERAPIST

## 2018-06-13 PROCEDURE — G8982 BODY POS GOAL STATUS: HCPCS | Performed by: PHYSICAL THERAPIST

## 2018-06-13 NOTE — PROGRESS NOTES
PT Evaluation     Today's date: 2018  Patient name: Rosy Raymundo  : 1945  MRN: [id number]  Referring provider: Jesse Read MD  Dx:   Encounter Diagnosis     ICD-10-CM    1  Dizziness and giddiness R42 Ambulatory referral to Physical Therapy                  Assessment    Assessment details: Pt presents with signs and symptoms synonymous of of L Posterior SCC hypofunction  Pt presents with decreased VOR speed, decreased postural awareness as well as symptom exacerbation with movement  Pt will benefit skilled PT intervention over the next few weeks to continue to diminish his symptoms and then be able to allow him to perform all desired activities with minimal to nil symptom exacerbation  If pt is to have a decline from his current he may benefit a more recent visit from his ENT however based upon today's presentation he likely will progress very well  Thank you very much for this kind referral     Understanding of Dx/Px/POC: good   Prognosis: good    Goals  STG 3:  1  Decrease symptoms to 1-2x's a week  2  Improve VOR to 1  5BPS  3  Independent with HEP  LTG 6:  1  Decrease symptom exacerbation to nil  2  Improve VOR to 2  0BPS  3  Able to perform all desired activities with symptom exacerbation        Plan  Patient would benefit from: skilled physical therapy  Planned therapy interventions: manual therapy, abdominal trunk stabilization, body mechanics training, patient education, postural training, strengthening, stretching, therapeutic activities, therapeutic exercise, therapeutic training, home exercise program, graded motor, graded exercise, graded activity, gait training, functional ROM exercises and flexibility  Frequency: 2x week  Duration in weeks: 8  Treatment plan discussed with: patient        Subjective Evaluation    History of Present Illness  Date of onset: 2018  Mechanism of injury: Pt is is a 72 yomale who is LHD and presents today stating that he woke up 1-2 months ago and stated that he had got up out of bed and the room began to spin counter clockwise spinning and he sat down it went away after a total of 3-4 minutes  Pt reports stood again and was about half intense  He was worried and called the ambulance and was taken to the ER  Pt reported he started to have chest pain develop and he was given an injection and that dissipated  When he got to the MRI and taken to the Hospital and he had indicated a small bleed of an older history, nothing acute and there were some amyloid angiopathy deposits  His cardiologist has indicated nothing of major concern other then "don't Get too excited "  He was admitted for few days and referred to a neurologist for follow up  Pt reports now he gets these symptoms a few times a week 3-5x's  And they last less than 10 seconds when rolling over in bed or getting up and down in bed  Pt reports that he would enjoy having these symptoms to go away  Pt states he does have a follow up with the neurologist at this time  Pt denies change in bowel or bladder or numbness tingling into extremity  Pain  No pain reported      Diagnostic Tests  MRI studies: abnormal (refer to subjective, all chronic not cute )  Patient Goals  Patient/family treatment goals: Decrease symptom exacerbation  Objective     Active Range of Motion   Cervical/Thoracic Spine   Cervical    Flexion: 60 degrees   Extension: 40 degrees   Left lateral flexion: 20 degrees   Right lateral flexion: 20 degrees   Left rotation: 55 degrees   Right rotation: 55 degrees     Additional Active Range of Motion Details  Forward head, rounded shoulders  BP L /82  O2 sat 97%  HR 62 BPM  B/L Sensation intact to light touch C3,4,5,6,7,8,T1,T2  UE screen Strong and Painless B  CS Screen without reproduction      SLS L 10" no LOB R 10" no LOB  Palpation  Ocular ROM looking to Pt's L smooth, good control, sup inf slow but smooth, to pt's R some tracking and skipping minimal to upward/downward angles  Convergence/Divergence  WFL  VOR to L 1 5 BPS, Sup 1  0BPS, Inf 1 5 BPS, to R  5 BPS  STs Head Shake 1 minimal beat to R with focus adjustment,  Head Thrust to R, 2x Mod size beating, DHP to the L + upbeating, with Eply to R + Down beating, VOR improved to 1  5BPS with Pt's R s/p maneuver       Alar Lig Intact, Sharp Brii (-)         Flowsheet Rows      Most Recent Value   PT/OT G-Codes   Current Score  12   Projected Score  70   Assessment Type  Evaluation   G code set  Changing & Maintaining Body Position   Changing and Maintaining Body Position Current Status ()  CK   Changing and Maintaining Body Position Goal Status ()  CH          Precautions: HTN, Hx of MI with CABG x 5, hx of Colon CA + Resection, Ventral Hernia    Daily Treatment Diary       Manual 6/13            DHP to L, Epley to R 10 mins + education                                                                Exercise Diary             DNF              Scap Add             VOR x 1 (2 targets) ML/LM/Sup/Inf             VOR x 1 (1 Target head shaking) ML, LM Sup/Inf             SLS             NBOS EC             NBOS foam             AP PA Walking             Side steps             Bike                                                                                                                                                            Modalities

## 2018-06-19 ENCOUNTER — OFFICE VISIT (OUTPATIENT)
Dept: PHYSICAL THERAPY | Facility: CLINIC | Age: 73
End: 2018-06-19
Payer: MEDICARE

## 2018-06-19 DIAGNOSIS — R42 DIZZINESS AND GIDDINESS: Primary | ICD-10-CM

## 2018-06-19 PROCEDURE — G8982 BODY POS GOAL STATUS: HCPCS | Performed by: PHYSICAL THERAPIST

## 2018-06-19 PROCEDURE — 97112 NEUROMUSCULAR REEDUCATION: CPT | Performed by: PHYSICAL THERAPIST

## 2018-06-19 PROCEDURE — G8981 BODY POS CURRENT STATUS: HCPCS | Performed by: PHYSICAL THERAPIST

## 2018-06-19 PROCEDURE — G8983 BODY POS D/C STATUS: HCPCS | Performed by: PHYSICAL THERAPIST

## 2018-06-19 NOTE — PROGRESS NOTES
Daily Note     Today's date: 2018  Patient name: Mendoza Guzman  : 1945  MRN: [id number]  Referring provider: Jacob العلي MD  Dx:   Encounter Diagnosis     ICD-10-CM    1  Dizziness and giddiness R42        Start Time: 1000  Stop Time: 1030  Total time in clinic (min): 30 minutes    Subjective: Pt presents today stating that he is feeling very well, has not had a single episode since his IE on   Objective: See treatment diary below      Assessment: Pt at this time demonstrating VOR speed 2 0 BPS all planes and being asymptomatic is likely safe to DC to HEP  Will allow his appointment to sustain on Friday and if he does not have a decline he can cancel that appointment as desired  If pt is to have a decline he is welcome to return as needed  Reviewed HEP and dc packet        Precautions: HTN, Hx of MI with CABG x 5, hx of Colon CA + Resection, Ventral Hernia    Daily Treatment Diary       Manual            DHP to L, Epley to R 10 mins + education NP                                                               Exercise Diary             DNF   10" x 10           Scap Add  10" x10           VOR x 1 (2 targets) ML/LM/Sup/Inf  1 min           VOR x 1 (1 Target head shaking) ML, LM Sup/Inf  1 min           SLS  10" x 10           NBOS EC             NBOS foam             AP PA Walking             Side steps             Bike  6 min                                                                                                                                                          Modalities

## 2018-06-22 ENCOUNTER — APPOINTMENT (OUTPATIENT)
Dept: PHYSICAL THERAPY | Facility: CLINIC | Age: 73
End: 2018-06-22
Payer: MEDICARE

## 2018-09-04 ENCOUNTER — OFFICE VISIT (OUTPATIENT)
Dept: UROLOGY | Facility: CLINIC | Age: 73
End: 2018-09-04
Payer: MEDICARE

## 2018-09-04 VITALS
HEART RATE: 70 BPM | DIASTOLIC BLOOD PRESSURE: 64 MMHG | SYSTOLIC BLOOD PRESSURE: 118 MMHG | BODY MASS INDEX: 29.7 KG/M2 | WEIGHT: 196 LBS | HEIGHT: 68 IN

## 2018-09-04 DIAGNOSIS — N40.0 BENIGN PROSTATIC HYPERPLASIA WITHOUT LOWER URINARY TRACT SYMPTOMS: Primary | ICD-10-CM

## 2018-09-04 LAB
SL AMB  POCT GLUCOSE, UA: NORMAL
SL AMB LEUKOCYTE ESTERASE,UA: NORMAL
SL AMB POCT BILIRUBIN,UA: NORMAL
SL AMB POCT BLOOD,UA: NORMAL
SL AMB POCT CLARITY,UA: CLEAR
SL AMB POCT COLOR,UA: YELLOW
SL AMB POCT KETONES,UA: NORMAL
SL AMB POCT NITRITE,UA: NORMAL
SL AMB POCT PH,UA: 6
SL AMB POCT SPECIFIC GRAVITY,UA: 1.01
SL AMB POCT URINE PROTEIN: NORMAL
SL AMB POCT UROBILINOGEN: NORMAL

## 2018-09-04 PROCEDURE — 99213 OFFICE O/P EST LOW 20 MIN: CPT | Performed by: PHYSICIAN ASSISTANT

## 2018-09-04 PROCEDURE — 81002 URINALYSIS NONAUTO W/O SCOPE: CPT | Performed by: PHYSICIAN ASSISTANT

## 2018-09-04 NOTE — PROGRESS NOTES
UROLOGY PROGRESS NOTE   Patient Identifiers: Karena Dolan (MRN [id number])  Date of Service: 9/4/2018    Subjective:     68-year-old male history of BPH  He had a prostate biopsy in 2013 for an abnormal exam which was benign  His last PSA was 0 2  Current PSA is pending  AUA index score is 4  Urine is clear  No other complaints    Patient has  no complaints  Objective:     VITALS:    Vitals:    09/04/18 1118   BP: 118/64   Pulse: 70     AUA SYMPTOM SCORE      Most Recent Value   AUA SYMPTOM SCORE   How often have you had a sensation of not emptying your bladder completely after you finished urinating? 1   How often have you had to urinate again less than two hours after you finished urinating? 1   How often have you found you stopped and started again several times when you urinate?  0   How often have you found it difficult to postpone urination? 0   How often have you had a weak urinary stream?  0   How often have you had to push or strain to begin urination? 0   How many times did you most typically get up to urinate from the time you went to bed at night until the time you got up in the morning?   2   Quality of Life: If you were to spend the rest of your life with your urinary condition just the way it is now, how would you feel about that?  0   AUA SYMPTOM SCORE  4            LABS:  Lab Results   Component Value Date    HGB 13 0 04/21/2018    HCT 39 7 04/21/2018    WBC 5 03 04/21/2018     04/21/2018   ]    Lab Results   Component Value Date     04/21/2018    K 3 8 04/21/2018     04/21/2018    CO2 28 04/21/2018    BUN 10 04/21/2018    CREATININE 0 78 04/21/2018    CALCIUM 8 5 04/21/2018   ]        INPATIENT MEDS:    Current Outpatient Prescriptions:     AMLODIPINE BESYLATE PO, Take 5 mg by mouth daily, Disp: , Rfl:     aspirin (ECOTRIN LOW STRENGTH) 81 mg EC tablet, Take 81 mg by mouth daily, Disp: , Rfl:     Calcium Carbonate-Vitamin D (CALCIUM 600+D PO), Take 1 tablet by mouth daily, Disp: , Rfl:     metoprolol tartrate (LOPRESSOR) 50 mg tablet, Take 75 mg by mouth every 12 (twelve) hours, Disp: , Rfl:     Multiple Vitamins-Iron (MULTIVITAMIN/IRON PO), Take 1 tablet by mouth daily, Disp: , Rfl:     nitroglycerin (NITROSTAT) 0 3 mg SL tablet, Place 1 tablet (0 3 mg total) under the tongue every 5 (five) minutes as needed for chest pain, Disp: 90 tablet, Rfl: 0    Omega-3 Fatty Acids (FISH OIL) 1000 MG CPDR, Take 1 capsule by mouth daily, Disp: , Rfl:     quinapril (ACCUPRIL) 20 mg tablet, Take 20 mg by mouth daily, Disp: , Rfl:     meclizine (ANTIVERT) 25 mg tablet, Take 1 tablet (25 mg total) by mouth every 8 (eight) hours (Patient not taking: Reported on 9/4/2018 ), Disp: 10 tablet, Rfl: 0      Physical Exam:   /64 (BP Location: Left arm, Patient Position: Sitting, Cuff Size: Adult)   Pulse 70   Ht 5' 8" (1 727 m)   Wt 88 9 kg (196 lb)   BMI 29 80 kg/m²   GEN: no acute distress    RESP: breathing comfortably with no accessory muscle use    ABD: soft, non-tender, non-distended   INCISION:    EXT: no significant peripheral edema   (Male): Penis circumcised, phallus normal, meatus patent  Testicles descended into scrotum bilaterally without masses nor tenderness  No inguinal hernias bilaterally  ADDIE: Prostate is not enlarged at 28 grams  The prostate is not boggy  The prostate is not tender  No nodules noted    RADIOLOGY:   none     Assessment:    1  BPH     Plan:   - follow-up 1 year with PSA prior to visit    -  -  -

## 2018-11-07 ENCOUNTER — TELEPHONE (OUTPATIENT)
Dept: NEUROLOGY | Facility: CLINIC | Age: 73
End: 2018-11-07

## 2019-01-26 ENCOUNTER — HOSPITAL ENCOUNTER (EMERGENCY)
Facility: HOSPITAL | Age: 74
Discharge: HOME | End: 2019-01-26
Attending: EMERGENCY MEDICINE | Admitting: EMERGENCY MEDICINE
Payer: MEDICARE

## 2019-01-26 ENCOUNTER — APPOINTMENT (EMERGENCY)
Dept: RADIOLOGY | Facility: HOSPITAL | Age: 74
End: 2019-01-26
Payer: MEDICARE

## 2019-01-26 VITALS
TEMPERATURE: 98.4 F | DIASTOLIC BLOOD PRESSURE: 77 MMHG | WEIGHT: 186.95 LBS | OXYGEN SATURATION: 97 % | SYSTOLIC BLOOD PRESSURE: 164 MMHG | BODY MASS INDEX: 28.43 KG/M2 | HEART RATE: 67 BPM | RESPIRATION RATE: 18 BRPM

## 2019-01-26 DIAGNOSIS — S22.32XA CLOSED FRACTURE OF ONE RIB OF LEFT SIDE, INITIAL ENCOUNTER: Primary | ICD-10-CM

## 2019-01-26 PROCEDURE — 71101 X-RAY EXAM UNILAT RIBS/CHEST: CPT

## 2019-01-26 PROCEDURE — 99284 EMERGENCY DEPT VISIT MOD MDM: CPT

## 2019-01-26 PROCEDURE — 93005 ELECTROCARDIOGRAM TRACING: CPT

## 2019-01-26 RX ORDER — HYDROCODONE BITARTRATE AND ACETAMINOPHEN 5; 325 MG/1; MG/1
1 TABLET ORAL EVERY 6 HOURS PRN
Qty: 12 TABLET | Refills: 0 | Status: SHIPPED | OUTPATIENT
Start: 2019-01-26 | End: 2019-02-05

## 2019-01-26 NOTE — ED PROVIDER NOTES
History  Chief Complaint   Patient presents with    Fall     Pt  c/o left sided rib pain after falling on ice five days ago  27-year-old male presents to emergency room for evaluation of left anterior rib pain  Patient states 5 days ago he slipped on ice and fell outside his home  States this pain is worse with movement and taking a deep breath  States he cannot lay on that side  Denies back pain  States he feels some swelling and a bump over this area  Denies abdominal pain nausea or vomiting  Denies other injury  States he can move his arms well  Denies head injury or loss of consciousness during the fall  History provided by:  Patient  Fall   Associated symptoms: no abdominal pain, no back pain, no headaches, no nausea, no neck pain and no vomiting        Prior to Admission Medications   Prescriptions Last Dose Informant Patient Reported? Taking?    AMLODIPINE BESYLATE PO  Self Yes No   Sig: Take 5 mg by mouth daily   Calcium Carbonate-Vitamin D (CALCIUM 600+D PO)  Self Yes No   Sig: Take 1 tablet by mouth daily   Multiple Vitamins-Iron (MULTIVITAMIN/IRON PO)  Self Yes No   Sig: Take 1 tablet by mouth daily   Omega-3 Fatty Acids (FISH OIL) 1000 MG CPDR  Self Yes No   Sig: Take 1 capsule by mouth daily   aspirin (ECOTRIN LOW STRENGTH) 81 mg EC tablet  Self Yes No   Sig: Take 81 mg by mouth daily   meclizine (ANTIVERT) 25 mg tablet  Self No No   Sig: Take 1 tablet (25 mg total) by mouth every 8 (eight) hours   Patient not taking: Reported on 9/4/2018    metoprolol tartrate (LOPRESSOR) 50 mg tablet  Self Yes No   Sig: Take 75 mg by mouth every 12 (twelve) hours   nitroglycerin (NITROSTAT) 0 3 mg SL tablet  Self No No   Sig: Place 1 tablet (0 3 mg total) under the tongue every 5 (five) minutes as needed for chest pain   quinapril (ACCUPRIL) 20 mg tablet  Self Yes No   Sig: Take 20 mg by mouth daily      Facility-Administered Medications: None       Past Medical History:   Diagnosis Date    Cancer ([facility] )     colon    Hyperlipidemia     Hypertension     Myocardial infarction ([facility] ) 2000    Ventral hernia        Past Surgical History:   Procedure Laterality Date    COLON SURGERY      colon resection    CORONARY ARTERY BYPASS GRAFT      x5    FL REPAIR INCISIONAL HERNIA,REDUCIBLE N/A 3/2/2018    Procedure: REPAIR HERNIA VENTRAL WITH MESH;  Surgeon: Campbell Kamara MD;  Location: [facility];  Service: General       No family history on file  I have reviewed and agree with the history as documented  Social History   Substance Use Topics    Smoking status: Former Smoker     Quit date: 1986    Smokeless tobacco: Never Used    Alcohol use 7 2 oz/week     12 Cans of beer per week        Review of Systems   Constitutional: Negative for fatigue  Respiratory: Negative for shortness of breath  Cardiovascular: Negative for palpitations  Gastrointestinal: Negative for abdominal pain, nausea and vomiting  Musculoskeletal: Negative for back pain and neck pain  Skin: Negative for rash and wound  Neurological: Negative for dizziness and headaches  Psychiatric/Behavioral: Negative for confusion  Physical Exam  Physical Exam   Constitutional: He is oriented to person, place, and time  He appears well-developed and well-nourished  HENT:   Head: Normocephalic and atraumatic  Eyes: Conjunctivae are normal    Neck: Neck supple  Cardiovascular: Normal rate, regular rhythm and normal heart sounds  Pulmonary/Chest: Effort normal and breath sounds normal  He exhibits tenderness  Abdominal: Soft  Bowel sounds are normal  There is no tenderness  Musculoskeletal: Normal range of motion  Cervical back: Normal         Thoracic back: Normal    Neurological: He is alert and oriented to person, place, and time  Skin: Skin is warm and dry  Nursing note and vitals reviewed        Vital Signs  ED Triage Vitals [01/26/19 1021]   Temperature Pulse Respirations Blood Pressure SpO2   98 4 °F (36 9 °C) 67 18 164/77 97 %      Temp Source Heart Rate Source Patient Position - Orthostatic VS BP Location FiO2 (%)   Oral Monitor Sitting Right arm --      Pain Score       8           Vitals:    01/26/19 1021   BP: 164/77   Pulse: 67   Patient Position - Orthostatic VS: Sitting       Visual Acuity      ED Medications  Medications - No data to display    Diagnostic Studies  Results Reviewed     None                 XR ribs left w pa chest min 3 views   Final Result by J Carlos Guzman DO (01/26 1232)   No active cardiopulmonary disease  Findings suspicious for nondisplaced fracture anterior aspect of the left 5th rib with associated soft tissue swelling  No evidence of pneumothorax  The study was marked in [facility] for immediate notification        Workstation performed: [id number]                    Procedures  ECG 12 Lead Documentation  Date/Time: 1/26/2019 10:50 AM  Performed by: Blanca Ryan  Authorized by: Blanca Ryan     Indications / Diagnosis:  Chest pain  ECG reviewed by me, the ED Provider: yes    Patient location:  ED  Interpretation:     Interpretation: normal    Rate:     ECG rate:  66    ECG rate assessment: normal    Rhythm:     Rhythm: sinus rhythm    Ectopy:     Ectopy: none    QRS:     QRS axis:  Normal  Conduction:     Conduction: abnormal      Abnormal conduction: complete RBBB    ST segments:     ST segments:  Normal  T waves:     T waves: normal             Phone Contacts  ED Phone Contact    ED Course                               MDM  Number of Diagnoses or Management Options  Closed fracture of one rib of left side, initial encounter:      Amount and/or Complexity of Data Reviewed  Tests in the radiology section of CPT®: ordered and reviewed    Patient Progress  Patient progress: stable    CritCare Time    Disposition  Final diagnoses:   Closed fracture of one rib of left side, initial encounter - 5th     Time reflects when diagnosis was documented in both MDM as applicable and the Disposition within this note     Time User Action Codes Description Comment    1/26/2019 12:41 PM Federica Harrington Add [S22 32XA] Closed fracture of one rib of left side, initial encounter     1/26/2019 12:41 PM Federica Harrington Modify [S22 32XA] Closed fracture of one rib of left side, initial encounter 5th      ED Disposition     ED Disposition Condition Comment    Discharge  Bo Coy discharge to home/self care  Condition at discharge: Good        Follow-up Information     Follow up With Specialties Details Why Contact Info Additional By[address], DO Family Medicine In 3 days  [address] [facility] [address]       [address] Emergency Department Emergency Medicine  If symptoms worsen [address] [address]  [phone]  ED,  [address], [place], [place], [address]          Patient's Medications   Discharge Prescriptions    HYDROCODONE-ACETAMINOPHEN (NORCO) 5-325 MG PER TABLET    Take 1 tablet by mouth every 6 (six) hours as needed for pain for up to 10 days Max Daily Amount: 4 tablets       Start Date: 1/26/2019 End Date: 2/5/2019       Order Dose: 1 tablet       Quantity: 12 tablet    Refills: 0     No discharge procedures on file      ED Provider  Electronically Signed by           Nohemy Ruiz PA-C  01/26/19 1257

## 2019-01-26 NOTE — DISCHARGE INSTRUCTIONS
Rib Fracture   WHAT YOU NEED TO KNOW:   A rib fracture is a crack or break in a rib bone  Rib fractures usually heal within 6 weeks  You should be able to return to normal activities before that time  Do not wrap anything around your body to try to splint your ribs  This can prevent you from taking deep breaths and increases your risk for pneumonia  DISCHARGE INSTRUCTIONS:   Call 911 for any of the following:   · You have trouble breathing  · You have new or increased pain  Return to the emergency department if:   · Your pain does not get better, even after treatment  · You have a fever or a cough  Contact your healthcare provider if:   · You have questions or concerns about your condition or care  Medicines:   · NSAIDs  help decrease swelling and pain  NSAIDs are available without a doctor's order  Ask your healthcare provider which medicine is right for you  Ask how much to take and when to take it  Take as directed  NSAIDs can cause stomach bleeding and kidney problems if not taken correctly  · Prescription pain medicine  may be given  Ask your healthcare provider how to take this medicine safely  Some prescription pain medicines contain acetaminophen  Do not take other medicines that contain acetaminophen without talking to your healthcare provider  Too much acetaminophen may cause liver damage  Prescription pain medicine may cause constipation  Ask your healthcare provider how to prevent or treat constipation  · Take your medicine as directed  Contact your healthcare provider if you think your medicine is not helping or if you have side effects  Tell him or her if you are allergic to any medicine  Keep a list of the medicines, vitamins, and herbs you take  Include the amounts, and when and why you take them  Bring the list or the pill bottles to follow-up visits  Carry your medicine list with you in case of an emergency    Follow up with your healthcare provider as directed:  Write down your questions so you remember to ask them during your visits  Deep breathing:  Deep breathing will decrease your risk for pneumonia  Hug a pillow on the injured side while doing this exercise, to decrease pain  Take a deep breath and hold it for as long as possible  You should let the air out and then cough strongly  Deep breaths help open your airway  You may be given an incentive spirometer to help take deep breaths  Put the plastic piece in your mouth  Take a slow, deep breath  You should then let the air out and cough  Repeat these steps 10 times every hour  Rest:  Rest and limit activity to decrease swelling and pain, and allow your injury to heal  Avoid activities that may cause more pain or damage to your ribs such as, pulling, pushing, and lifting  As your pain decreases, begin movements slowly  Take short walks between rest periods  Ice:  Apply ice on the fractured area for 15 to 20 minutes every hour or as directed  Use an ice pack or put crushed ice in a plastic bag  Cover it with a towel  Ice helps prevent tissue damage and decreases swelling and pain  © 2017 [facility] Information is for End User's use only and may not be sold, redistributed or otherwise used for commercial purposes  All illustrations and images included in CareNotes® are the copyrighted property of A D A M , Inc  or Lionel Mantilla  The above information is an  only  It is not intended as medical advice for individual conditions or treatments  Talk to your doctor, nurse or pharmacist before following any medical regimen to see if it is safe and effective for you

## 2019-01-27 LAB
ATRIAL RATE: 66 BPM
P AXIS: 68 DEGREES
PR INTERVAL: 172 MS
QRS AXIS: -59 DEGREES
QRSD INTERVAL: 138 MS
QT INTERVAL: 438 MS
QTC INTERVAL: 459 MS
T WAVE AXIS: -19 DEGREES
VENTRICULAR RATE: 66 BPM

## 2019-01-27 PROCEDURE — 93010 ELECTROCARDIOGRAM REPORT: CPT | Performed by: INTERNAL MEDICINE

## 2019-09-11 ENCOUNTER — OFFICE VISIT (OUTPATIENT)
Dept: UROLOGY | Facility: CLINIC | Age: 74
End: 2019-09-11
Payer: MEDICARE

## 2019-09-11 VITALS
DIASTOLIC BLOOD PRESSURE: 72 MMHG | HEART RATE: 60 BPM | HEIGHT: 68 IN | WEIGHT: 202.8 LBS | SYSTOLIC BLOOD PRESSURE: 142 MMHG | BODY MASS INDEX: 30.74 KG/M2

## 2019-09-11 DIAGNOSIS — N40.0 BENIGN PROSTATIC HYPERPLASIA WITHOUT LOWER URINARY TRACT SYMPTOMS: Primary | ICD-10-CM

## 2019-09-11 PROCEDURE — 99213 OFFICE O/P EST LOW 20 MIN: CPT | Performed by: PHYSICIAN ASSISTANT

## 2019-09-11 RX ORDER — ROSUVASTATIN CALCIUM 10 MG/1
10 TABLET, COATED ORAL DAILY
COMMUNITY

## 2019-09-11 NOTE — PROGRESS NOTES
UROLOGY PROGRESS NOTE   Patient Identifiers: Beverly Weir (MRN [id number])  Date of Service: 9/11/2019    Subjective:    72-year-old man history of BPH  He had a prostate biopsy in 2013 for an abnormal prostate exam which was benign  His last PSA was 0 2  He has no lower tract complaints  Patient has  no complaints        Objective:     VITALS:    Vitals:    09/11/19 0853   BP: 142/72   Pulse: 60           LABS:  Lab Results   Component Value Date    HGB 13 0 04/21/2018    HCT 39 7 04/21/2018    WBC 5 03 04/21/2018     04/21/2018   ]    Lab Results   Component Value Date    K 3 8 04/21/2018     04/21/2018    CO2 28 04/21/2018    BUN 10 04/21/2018    CREATININE 0 78 04/21/2018    CALCIUM 8 5 04/21/2018   ]        INPATIENT MEDS:    Current Outpatient Medications:     AMLODIPINE BESYLATE PO, Take 5 mg by mouth daily, Disp: , Rfl:     aspirin (ECOTRIN LOW STRENGTH) 81 mg EC tablet, Take 81 mg by mouth daily, Disp: , Rfl:     Calcium Carbonate-Vitamin D (CALCIUM 600+D PO), Take 1 tablet by mouth daily, Disp: , Rfl:     metoprolol tartrate (LOPRESSOR) 50 mg tablet, Take 75 mg by mouth every 12 (twelve) hours, Disp: , Rfl:     Multiple Vitamins-Iron (MULTIVITAMIN/IRON PO), Take 1 tablet by mouth daily, Disp: , Rfl:     nitroglycerin (NITROSTAT) 0 3 mg SL tablet, Place 1 tablet (0 3 mg total) under the tongue every 5 (five) minutes as needed for chest pain, Disp: 90 tablet, Rfl: 0    Omega-3 Fatty Acids (FISH OIL) 1000 MG CPDR, Take 1 capsule by mouth daily, Disp: , Rfl:     quinapril (ACCUPRIL) 20 mg tablet, Take 20 mg by mouth daily, Disp: , Rfl:     meclizine (ANTIVERT) 25 mg tablet, Take 1 tablet (25 mg total) by mouth every 8 (eight) hours (Patient not taking: Reported on 9/4/2018 ), Disp: 10 tablet, Rfl: 0    rosuvastatin (CRESTOR) 10 MG tablet, Take 10 mg by mouth daily, Disp: , Rfl:       Physical Exam:   /72 (BP Location: Left arm, Patient Position: Sitting, Cuff Size: Adult)   Pulse 60   Ht 5' 8" (1 727 m)   Wt 92 kg (202 lb 12 8 oz)   BMI 30 84 kg/m²   GEN: no acute distress    RESP: breathing comfortably with no accessory muscle use    ABD: soft, non-tender, non-distended   INCISION:    EXT: no significant peripheral edema   (Male): Penis circumcised, phallus normal, meatus patent  Testicles descended into scrotum bilaterally without masses nor tenderness  No inguinal hernias bilaterally  ADDIE: Prostate is enlarged at 35 grams  The prostate is not boggy  The prostate is not tender  Rough texture on the right side  RADIOLOGY:     None     Assessment:    1   BPH     Plan:   - follow-up in 1 year with PSA prior to visit  -  -  -

## 2020-09-17 ENCOUNTER — TELEPHONE (OUTPATIENT)
Dept: UROLOGY | Facility: CLINIC | Age: 75
End: 2020-09-17

## 2020-09-17 NOTE — TELEPHONE ENCOUNTER
Called and spoke with patient at this time  Advised as Dr Horacio Subramanian retired and [place] office is closed, his upcoming appointment was cancelled  Advised our providers have reviewed his chart, and he is ok to follow up with urology on an as needed basis  Patient agreeable to plan  Appointment was cancelled

## 2021-04-06 ENCOUNTER — IMMUNIZATIONS (OUTPATIENT)
Dept: FAMILY MEDICINE CLINIC | Facility: HOSPITAL | Age: 76
End: 2021-04-06

## 2021-04-06 DIAGNOSIS — Z23 ENCOUNTER FOR IMMUNIZATION: Primary | ICD-10-CM

## 2021-04-06 PROCEDURE — 91300 SARS-COV-2 / COVID-19 MRNA VACCINE (PFIZER-BIONTECH) 30 MCG: CPT

## 2021-04-06 PROCEDURE — 0001A SARS-COV-2 / COVID-19 MRNA VACCINE (PFIZER-BIONTECH) 30 MCG: CPT

## 2021-04-28 ENCOUNTER — IMMUNIZATIONS (OUTPATIENT)
Dept: FAMILY MEDICINE CLINIC | Facility: HOSPITAL | Age: 76
End: 2021-04-28

## 2021-04-28 DIAGNOSIS — Z23 ENCOUNTER FOR IMMUNIZATION: Primary | ICD-10-CM

## 2021-04-28 PROCEDURE — 91300 SARS-COV-2 / COVID-19 MRNA VACCINE (PFIZER-BIONTECH) 30 MCG: CPT

## 2021-04-28 PROCEDURE — 0002A SARS-COV-2 / COVID-19 MRNA VACCINE (PFIZER-BIONTECH) 30 MCG: CPT

## 2024-04-02 ENCOUNTER — APPOINTMENT (OUTPATIENT)
Dept: LAB | Facility: CLINIC | Age: 79
End: 2024-04-02
Payer: MEDICARE

## 2024-04-02 DIAGNOSIS — I25.10 ATHEROSCLEROSIS OF NATIVE CORONARY ARTERY, UNSPECIFIED WHETHER ANGINA PRESENT, UNSPECIFIED WHETHER NATIVE OR TRANSPLANTED HEART: ICD-10-CM

## 2024-04-02 DIAGNOSIS — R73.9 BLOOD GLUCOSE ELEVATED: ICD-10-CM

## 2024-04-02 LAB
EST. AVERAGE GLUCOSE BLD GHB EST-MCNC: 229 MG/DL
HBA1C MFR BLD: 9.6 %
MAGNESIUM SERPL-MCNC: 2 MG/DL (ref 1.9–2.7)

## 2024-04-02 PROCEDURE — 36415 COLL VENOUS BLD VENIPUNCTURE: CPT

## 2024-04-02 PROCEDURE — 83735 ASSAY OF MAGNESIUM: CPT

## 2024-04-02 PROCEDURE — 83036 HEMOGLOBIN GLYCOSYLATED A1C: CPT

## 2024-06-03 ENCOUNTER — APPOINTMENT (OUTPATIENT)
Dept: LAB | Facility: CLINIC | Age: 79
End: 2024-06-03
Payer: MEDICARE

## 2024-06-03 DIAGNOSIS — R73.9 BLOOD GLUCOSE ELEVATED: ICD-10-CM

## 2024-06-03 LAB
ALBUMIN SERPL BCP-MCNC: 4.2 G/DL (ref 3.5–5)
ALP SERPL-CCNC: 54 U/L (ref 34–104)
ALT SERPL W P-5'-P-CCNC: 18 U/L (ref 7–52)
ANION GAP SERPL CALCULATED.3IONS-SCNC: 8 MMOL/L (ref 4–13)
AST SERPL W P-5'-P-CCNC: 23 U/L (ref 13–39)
BILIRUB DIRECT SERPL-MCNC: 0.21 MG/DL (ref 0–0.2)
BILIRUB SERPL-MCNC: 1.03 MG/DL (ref 0.2–1)
BUN SERPL-MCNC: 10 MG/DL (ref 5–25)
CALCIUM SERPL-MCNC: 9.2 MG/DL (ref 8.4–10.2)
CHLORIDE SERPL-SCNC: 104 MMOL/L (ref 96–108)
CO2 SERPL-SCNC: 26 MMOL/L (ref 21–32)
CREAT SERPL-MCNC: 0.62 MG/DL (ref 0.6–1.3)
ERYTHROCYTE [DISTWIDTH] IN BLOOD BY AUTOMATED COUNT: 12.9 % (ref 11.6–15.1)
GFR SERPL CREATININE-BSD FRML MDRD: 95 ML/MIN/1.73SQ M
GLUCOSE P FAST SERPL-MCNC: 141 MG/DL (ref 65–99)
HCT VFR BLD AUTO: 42.2 % (ref 36.5–49.3)
HGB BLD-MCNC: 14.1 G/DL (ref 12–17)
MCH RBC QN AUTO: 34.1 PG (ref 26.8–34.3)
MCHC RBC AUTO-ENTMCNC: 33.4 G/DL (ref 31.4–37.4)
MCV RBC AUTO: 102 FL (ref 82–98)
PLATELET # BLD AUTO: 195 THOUSANDS/UL (ref 149–390)
PMV BLD AUTO: 10.1 FL (ref 8.9–12.7)
POTASSIUM SERPL-SCNC: 4 MMOL/L (ref 3.5–5.3)
PROT SERPL-MCNC: 7.8 G/DL (ref 6.4–8.4)
RBC # BLD AUTO: 4.13 MILLION/UL (ref 3.88–5.62)
SODIUM SERPL-SCNC: 138 MMOL/L (ref 135–147)
WBC # BLD AUTO: 9.86 THOUSAND/UL (ref 4.31–10.16)

## 2024-06-03 PROCEDURE — 80076 HEPATIC FUNCTION PANEL: CPT

## 2024-06-03 PROCEDURE — 80048 BASIC METABOLIC PNL TOTAL CA: CPT

## 2024-06-03 PROCEDURE — 36415 COLL VENOUS BLD VENIPUNCTURE: CPT

## 2024-06-03 PROCEDURE — 85027 COMPLETE CBC AUTOMATED: CPT

## 2024-07-08 ENCOUNTER — APPOINTMENT (OUTPATIENT)
Dept: LAB | Facility: CLINIC | Age: 79
End: 2024-07-08
Payer: MEDICARE

## 2024-07-08 DIAGNOSIS — R73.9 BLOOD GLUCOSE ELEVATED: ICD-10-CM

## 2024-07-08 LAB
ANION GAP SERPL CALCULATED.3IONS-SCNC: 7 MMOL/L (ref 4–13)
BUN SERPL-MCNC: 13 MG/DL (ref 5–25)
CALCIUM SERPL-MCNC: 9.1 MG/DL (ref 8.4–10.2)
CHLORIDE SERPL-SCNC: 104 MMOL/L (ref 96–108)
CO2 SERPL-SCNC: 27 MMOL/L (ref 21–32)
CREAT SERPL-MCNC: 0.63 MG/DL (ref 0.6–1.3)
EST. AVERAGE GLUCOSE BLD GHB EST-MCNC: 143 MG/DL
GFR SERPL CREATININE-BSD FRML MDRD: 95 ML/MIN/1.73SQ M
GLUCOSE P FAST SERPL-MCNC: 144 MG/DL (ref 65–99)
HBA1C MFR BLD: 6.6 %
POTASSIUM SERPL-SCNC: 3.7 MMOL/L (ref 3.5–5.3)
SODIUM SERPL-SCNC: 138 MMOL/L (ref 135–147)

## 2024-07-08 PROCEDURE — 36415 COLL VENOUS BLD VENIPUNCTURE: CPT

## 2024-07-08 PROCEDURE — 80048 BASIC METABOLIC PNL TOTAL CA: CPT

## 2024-07-08 PROCEDURE — 83036 HEMOGLOBIN GLYCOSYLATED A1C: CPT

## 2024-10-03 ENCOUNTER — APPOINTMENT (OUTPATIENT)
Dept: LAB | Facility: CLINIC | Age: 79
End: 2024-10-03
Payer: MEDICARE

## 2024-10-03 DIAGNOSIS — I25.10 ATHEROSCLEROSIS OF NATIVE CORONARY ARTERY WITHOUT ANGINA PECTORIS, UNSPECIFIED WHETHER NATIVE OR TRANSPLANTED HEART: ICD-10-CM

## 2024-10-03 LAB
ALBUMIN SERPL BCG-MCNC: 4.1 G/DL (ref 3.5–5)
ALP SERPL-CCNC: 51 U/L (ref 34–104)
ALT SERPL W P-5'-P-CCNC: 23 U/L (ref 7–52)
ANION GAP SERPL CALCULATED.3IONS-SCNC: 5 MMOL/L (ref 4–13)
AST SERPL W P-5'-P-CCNC: 22 U/L (ref 13–39)
BILIRUB DIRECT SERPL-MCNC: 0.27 MG/DL (ref 0–0.2)
BILIRUB SERPL-MCNC: 1.24 MG/DL (ref 0.2–1)
BUN SERPL-MCNC: 9 MG/DL (ref 5–25)
CALCIUM SERPL-MCNC: 9.3 MG/DL (ref 8.4–10.2)
CHLORIDE SERPL-SCNC: 104 MMOL/L (ref 96–108)
CHOLEST SERPL-MCNC: 94 MG/DL
CO2 SERPL-SCNC: 30 MMOL/L (ref 21–32)
CREAT SERPL-MCNC: 0.64 MG/DL (ref 0.6–1.3)
ERYTHROCYTE [DISTWIDTH] IN BLOOD BY AUTOMATED COUNT: 12.9 % (ref 11.6–15.1)
GFR SERPL CREATININE-BSD FRML MDRD: 94 ML/MIN/1.73SQ M
GLUCOSE P FAST SERPL-MCNC: 137 MG/DL (ref 65–99)
HCT VFR BLD AUTO: 44 % (ref 36.5–49.3)
HDLC SERPL-MCNC: 42 MG/DL
HGB BLD-MCNC: 14.7 G/DL (ref 12–17)
LDLC SERPL CALC-MCNC: 30 MG/DL (ref 0–100)
MAGNESIUM SERPL-MCNC: 2 MG/DL (ref 1.9–2.7)
MCH RBC QN AUTO: 34.3 PG (ref 26.8–34.3)
MCHC RBC AUTO-ENTMCNC: 33.4 G/DL (ref 31.4–37.4)
MCV RBC AUTO: 103 FL (ref 82–98)
NONHDLC SERPL-MCNC: 52 MG/DL
PLATELET # BLD AUTO: 203 THOUSANDS/UL (ref 149–390)
PMV BLD AUTO: 10.2 FL (ref 8.9–12.7)
POTASSIUM SERPL-SCNC: 3.9 MMOL/L (ref 3.5–5.3)
PROT SERPL-MCNC: 7.5 G/DL (ref 6.4–8.4)
RBC # BLD AUTO: 4.29 MILLION/UL (ref 3.88–5.62)
SODIUM SERPL-SCNC: 139 MMOL/L (ref 135–147)
TRIGL SERPL-MCNC: 112 MG/DL
WBC # BLD AUTO: 12.06 THOUSAND/UL (ref 4.31–10.16)

## 2024-10-03 PROCEDURE — 80053 COMPREHEN METABOLIC PANEL: CPT

## 2024-10-03 PROCEDURE — 85027 COMPLETE CBC AUTOMATED: CPT

## 2024-10-03 PROCEDURE — 36415 COLL VENOUS BLD VENIPUNCTURE: CPT

## 2024-10-03 PROCEDURE — 83735 ASSAY OF MAGNESIUM: CPT

## 2024-10-03 PROCEDURE — 80061 LIPID PANEL: CPT

## 2024-10-03 PROCEDURE — 82248 BILIRUBIN DIRECT: CPT

## 2024-10-09 NOTE — PROGRESS NOTES
PT Discharge    Today's date: 2018  Patient name: Nakul Sterling  : 1945  MRN: [id number]  Referring provider: Luz Simon MD  Dx:   Encounter Diagnosis     ICD-10-CM    1  Dizziness and giddiness R42        Start Time: 1000  Stop Time: 1030  Total time in clinic (min): 30 minutes    Assessment/Plan Pt has not been present since 18  Pt's chart will be DC in compliance of facility policy as all Charts are DC within 30 days of last scheduled visit          Subjective    Objective    Flowsheet Rows      Most Recent Value   PT/OT G-Codes   Current Score  12   Projected Score  63   Assessment Type  Discharge   G code set  Changing & Maintaining Body Position   Changing and Maintaining Body Position Current Status ()  [facility]   Changing and Maintaining Body Position Goal Status ()  [facility]   Changing and Maintaining Body Position Discharge Status ()  [facility] Apixaban/Eliquis is used to treat and prevent blood clots. If you are not able to swallow the tablets whole, they may be crushed and mixed in water, apple juice, or applesauce and promptly taken within four hours. Never skip a dose of Apixaban/Eliquis. If you forget to take your Apixaban/Eliquis, take a dose as soon as you remember. If it is almost time for your next Apixaban/Eliquis dose, wait until then and take a regular dose. DO NOT take an extra pill to ‘catch up’.  NEVER TAKE A DOUBLE DOSE. Notify your doctor that you missed a dose. Take Apixaban/Eliquis at the same time each morning and evening. Apixaban/Eliquis may be taken with other medication or food.

## 2024-10-29 ENCOUNTER — APPOINTMENT (EMERGENCY)
Dept: RADIOLOGY | Facility: HOSPITAL | Age: 79
End: 2024-10-29
Payer: MEDICARE

## 2024-10-29 ENCOUNTER — PREP FOR PROCEDURE (OUTPATIENT)
Dept: GASTROENTEROLOGY | Facility: CLINIC | Age: 79
End: 2024-10-29

## 2024-10-29 ENCOUNTER — APPOINTMENT (OUTPATIENT)
Dept: PERIOP | Facility: HOSPITAL | Age: 79
End: 2024-10-29
Payer: MEDICARE

## 2024-10-29 ENCOUNTER — ANESTHESIA EVENT (EMERGENCY)
Dept: PERIOP | Facility: HOSPITAL | Age: 79
End: 2024-10-29
Payer: MEDICARE

## 2024-10-29 ENCOUNTER — HOSPITAL ENCOUNTER (EMERGENCY)
Facility: HOSPITAL | Age: 79
Discharge: HOME | End: 2024-10-29
Attending: EMERGENCY MEDICINE | Admitting: EMERGENCY MEDICINE
Payer: MEDICARE

## 2024-10-29 ENCOUNTER — ANESTHESIA (EMERGENCY)
Dept: PERIOP | Facility: HOSPITAL | Age: 79
End: 2024-10-29
Payer: MEDICARE

## 2024-10-29 VITALS
HEART RATE: 77 BPM | DIASTOLIC BLOOD PRESSURE: 64 MMHG | RESPIRATION RATE: 18 BRPM | WEIGHT: 185 LBS | BODY MASS INDEX: 28.04 KG/M2 | OXYGEN SATURATION: 92 % | TEMPERATURE: 98 F | HEIGHT: 68 IN | SYSTOLIC BLOOD PRESSURE: 117 MMHG

## 2024-10-29 DIAGNOSIS — K20.80 LOS ANGELES GRADE D ESOPHAGITIS: Primary | ICD-10-CM
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
## 2024-10-29 DIAGNOSIS — T18.128A ESOPHAGEAL OBSTRUCTION DUE TO FOOD IMPACTION: ICD-10-CM

## 2024-10-29 DIAGNOSIS — T18.128A FOOD IMPACTION OF ESOPHAGUS, INITIAL ENCOUNTER: Primary | ICD-10-CM
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
## 2024-10-29 PROBLEM — Z86.73 HISTORY OF STROKE: Status: ACTIVE | Noted: 2023-09-13

## 2024-10-29 PROBLEM — E11.40 TYPE 2 DIABETES MELLITUS WITH DIABETIC NEUROPATHY, WITHOUT LONG-TERM CURRENT USE OF INSULIN (HCC): Status: ACTIVE | Noted: 2023-09-25

## 2024-10-29 PROBLEM — C18.9 COLON CARCINOMA (HCC): Status: ACTIVE | Noted: 2019-09-11

## 2024-10-29 PROBLEM — I25.10 CORONARY ARTERY DISEASE INVOLVING NATIVE CORONARY ARTERY OF NATIVE HEART WITHOUT ANGINA PECTORIS: Status: ACTIVE | Noted: 2019-09-11

## 2024-10-29 PROBLEM — Z98.61 HISTORY OF PERCUTANEOUS CORONARY INTERVENTION: Status: ACTIVE | Noted: 2019-09-11

## 2024-10-29 LAB — GLUCOSE SERPL-MCNC: 165 MG/DL (ref 65–140)

## 2024-10-29 PROCEDURE — 99285 EMERGENCY DEPT VISIT HI MDM: CPT | Performed by: EMERGENCY MEDICINE

## 2024-10-29 PROCEDURE — 96374 THER/PROPH/DIAG INJ IV PUSH: CPT

## 2024-10-29 PROCEDURE — 99284 EMERGENCY DEPT VISIT MOD MDM: CPT | Performed by: STUDENT IN AN ORGANIZED HEALTH CARE EDUCATION/TRAINING PROGRAM

## 2024-10-29 PROCEDURE — 96361 HYDRATE IV INFUSION ADD-ON: CPT

## 2024-10-29 PROCEDURE — 43247 EGD REMOVE FOREIGN BODY: CPT | Performed by: STUDENT IN AN ORGANIZED HEALTH CARE EDUCATION/TRAINING PROGRAM

## 2024-10-29 PROCEDURE — 82948 REAGENT STRIP/BLOOD GLUCOSE: CPT

## 2024-10-29 PROCEDURE — 71046 X-RAY EXAM CHEST 2 VIEWS: CPT

## 2024-10-29 PROCEDURE — 99284 EMERGENCY DEPT VISIT MOD MDM: CPT

## 2024-10-29 RX ORDER — FENTANYL CITRATE/PF 50 MCG/ML
25 SYRINGE (ML) INJECTION
Status: DISCONTINUED | OUTPATIENT
Start: 2024-10-29 | End: 2024-10-30 | Stop reason: HOSPADM

## 2024-10-29 RX ORDER — LIDOCAINE HYDROCHLORIDE 10 MG/ML
INJECTION, SOLUTION EPIDURAL; INFILTRATION; INTRACAUDAL; PERINEURAL AS NEEDED
Status: DISCONTINUED | OUTPATIENT
Start: 2024-10-29 | End: 2024-10-29

## 2024-10-29 RX ORDER — PROPOFOL 10 MG/ML
INJECTION, EMULSION INTRAVENOUS AS NEEDED
Status: DISCONTINUED | OUTPATIENT
Start: 2024-10-29 | End: 2024-10-29

## 2024-10-29 RX ORDER — SUCCINYLCHOLINE/SOD CL,ISO/PF 100 MG/5ML
SYRINGE (ML) INTRAVENOUS AS NEEDED
Status: DISCONTINUED | OUTPATIENT
Start: 2024-10-29 | End: 2024-10-29

## 2024-10-29 RX ORDER — ONDANSETRON 2 MG/ML
4 INJECTION INTRAMUSCULAR; INTRAVENOUS ONCE AS NEEDED
Status: DISCONTINUED | OUTPATIENT
Start: 2024-10-29 | End: 2024-10-30 | Stop reason: HOSPADM

## 2024-10-29 RX ORDER — SODIUM CHLORIDE, SODIUM LACTATE, POTASSIUM CHLORIDE, CALCIUM CHLORIDE 600; 310; 30; 20 MG/100ML; MG/100ML; MG/100ML; MG/100ML
125 INJECTION, SOLUTION INTRAVENOUS CONTINUOUS
Status: DISCONTINUED | OUTPATIENT
Start: 2024-10-29 | End: 2024-10-30 | Stop reason: HOSPADM

## 2024-10-29 RX ORDER — DEXAMETHASONE SODIUM PHOSPHATE 10 MG/ML
INJECTION, SOLUTION INTRAMUSCULAR; INTRAVENOUS AS NEEDED
Status: DISCONTINUED | OUTPATIENT
Start: 2024-10-29 | End: 2024-10-29

## 2024-10-29 RX ORDER — ONDANSETRON 2 MG/ML
INJECTION INTRAMUSCULAR; INTRAVENOUS AS NEEDED
Status: DISCONTINUED | OUTPATIENT
Start: 2024-10-29 | End: 2024-10-29

## 2024-10-29 RX ORDER — NITROGLYCERIN 0.4 MG/1
0.4 TABLET SUBLINGUAL
Status: DISCONTINUED | OUTPATIENT
Start: 2024-10-29 | End: 2024-10-30 | Stop reason: HOSPADM

## 2024-10-29 RX ORDER — METOCLOPRAMIDE HYDROCHLORIDE 5 MG/ML
INJECTION INTRAMUSCULAR; INTRAVENOUS AS NEEDED
Status: DISCONTINUED | OUTPATIENT
Start: 2024-10-29 | End: 2024-10-29

## 2024-10-29 RX ADMIN — NITROGLYCERIN 0.4 MG: 0.4 TABLET SUBLINGUAL at 18:03

## 2024-10-29 RX ADMIN — PROPOFOL 50 MG: 10 INJECTION, EMULSION INTRAVENOUS at 21:11

## 2024-10-29 RX ADMIN — ONDANSETRON 4 MG: 2 INJECTION INTRAMUSCULAR; INTRAVENOUS at 21:03

## 2024-10-29 RX ADMIN — DEXAMETHASONE SODIUM PHOSPHATE 10 MG: 10 INJECTION, SOLUTION INTRAMUSCULAR; INTRAVENOUS at 21:03

## 2024-10-29 RX ADMIN — METOCLOPRAMIDE 10 MG: 5 INJECTION, SOLUTION INTRAMUSCULAR; INTRAVENOUS at 21:05

## 2024-10-29 RX ADMIN — GLUCAGON 4 MG: KIT at 18:05

## 2024-10-29 RX ADMIN — PROPOFOL 150 MG: 10 INJECTION, EMULSION INTRAVENOUS at 21:00

## 2024-10-29 RX ADMIN — LIDOCAINE HYDROCHLORIDE 50 MG: 10 INJECTION, SOLUTION EPIDURAL; INFILTRATION; INTRACAUDAL; PERINEURAL at 21:00

## 2024-10-29 RX ADMIN — SODIUM CHLORIDE, SODIUM LACTATE, POTASSIUM CHLORIDE, AND CALCIUM CHLORIDE 125 ML/HR: .6; .31; .03; .02 INJECTION, SOLUTION INTRAVENOUS at 20:03

## 2024-10-29 RX ADMIN — NITROGLYCERIN 0.4 MG: 0.4 TABLET SUBLINGUAL at 18:17

## 2024-10-29 RX ADMIN — Medication 100 MG: at 21:00

## 2024-10-29 RX ADMIN — PROPOFOL 50 MG: 10 INJECTION, EMULSION INTRAVENOUS at 21:01

## 2024-10-29 NOTE — CONSULTS
Consultation -  [facility]  Carlyle Mora 79 y.o. male MRN: [id number]  Unit/Bed#: TR 03 Encounter: [id number]        Consult to Gastroenterology  Consult performed by: Cathleen Owen DO  Consult ordered by: Gómez Palacios DO          Reason for Consult / Principal Problem: Food Impaction       ASSESSMENT AND PLAN:      Food Impaction: Patient presenting with C/O globus sensation. Symptoms first started at approx 3PM while eating pulled pork sandwich. Unable to tolerate solid/liquids or own secretions at this time. No prior history of food impaction. Prior EGD but for unclear reason. At this time, differential includes peptic stricture, EOE, schatzki's ring, and r/o malignancy. Will plan for emergent upper endoscopy to evaluate.     Plan:  Remain NPO at this time  Plan for emergent EGD to further evaluate  Further recommendations pending EGD findings     ______________________________________________________________________    HPI:  Mr. Carlyle Mora is a 79-year-old male with a past medical history of colon cancer s/p resection, hyperlipidemia, hypertension, tobacco abuse, and CAD status post CABG who presented to [facility] on [facility]24 with globus sensation and inability to tolerate secretions.  Patient states that he was eating a pulled pork sandwich at approximately 3 PM when he suddenly felt the food becomes stuck in his upper to mid esophagus.  Patient states that immediately after he started coughing and choking.  Stated that he attempted to drink water but immediately after regurgitated up the liquid.  Admitted to having additional issues tolerating his secretions thereafter prompting his ED arrival.  At time of ED arrival, patient hemodynamically stable.  He was given glucagon which provided him with no relief.  Unable to tolerate liquids while in the emergency room. Patient denies any prior difficulty with dysphagia to solids or liquids.  Denies any prior history of food  impactions.  He states that he has had an EGD completed in the past but unable to recall reason.    REVIEW OF SYSTEMS:    CONSTITUTIONAL: Denies any fever, chills, rigors, and weight loss.  HEENT: No earache or tinnitus. Denies hearing loss or visual disturbances.  CARDIOVASCULAR: No chest pain or palpitations.   RESPIRATORY: Denies any cough, hemoptysis, shortness of breath or dyspnea on exertion.  GASTROINTESTINAL: As noted in the History of Present Illness.   GENITOURINARY: No problems with urination. Denies any hematuria or dysuria.  NEUROLOGIC: No dizziness or vertigo, denies headaches.   MUSCULOSKELETAL: Denies any muscle or joint pain.   SKIN: Denies skin rashes or itching.   ENDOCRINE: Denies excessive thirst. Denies intolerance to heat or cold.  PSYCHOSOCIAL: Denies depression or anxiety. Denies any recent memory loss.       Historical Information   Past Medical History:   Diagnosis Date    Cancer (HCC)     colon    Hyperlipidemia     Hypertension     Myocardial infarction (HCC)     Ventral hernia      Past Surgical History:   Procedure Laterality Date    COLON SURGERY      colon resection    CORONARY ARTERY BYPASS GRAFT      x5    CO REPAIR FIRST ABDOMINAL WALL HERNIA N/A 3/2/2018    Procedure: REPAIR HERNIA VENTRAL WITH MESH;  Surgeon: Rafal Chase MD;  Location: [facility];  Service: General     Social History   Social History     Substance and Sexual Activity   Alcohol Use Yes    Alcohol/week: 12.0 standard drinks of alcohol    Types: 12 Cans of beer per week     Social History     Substance and Sexual Activity   Drug Use Not on file     Social History     Tobacco Use   Smoking Status Former    Current packs/day: 0.00    Types: Cigarettes    Quit date:     Years since quittin.8   Smokeless Tobacco Never     History reviewed. No pertinent family history.    Meds/Allergies     Not in a hospital admission.    Current Facility-Administered Medications:     nitroglycerin (NITROSTAT) SL tablet  "0.4 mg, Q5 Min PRN    Allergies   Allergen Reactions    Penicillins Shortness Of Breath and Rash           Objective     Blood pressure 133/61, pulse 66, temperature (!) 97.1 °F (36.2 °C), temperature source Temporal, resp. rate (!) 24, height 5' 8\" (1.727 m), SpO2 97%. Body mass index is 30.84 kg/m².    No intake or output data in the 24 hours ending 10/29/24 1946      PHYSICAL EXAM:      General Appearance:   Alert, cooperative, no distress   HEENT:   Normocephalic, atraumatic, anicteric.     Neck:  Supple, symmetrical, trachea midline   Lungs:   Clear to auscultation bilaterally; no rales, rhonchi or wheezing; respirations unlabored    Heart::   Regular rate and rhythm; no murmur, rub, or gallop.   Abdomen:   Soft, non-tender, non-distended; normal bowel sounds; no masses, no organomegaly    Genitalia:   Deferred    Rectal:   Deferred    Extremities:  No cyanosis, clubbing or edema    Pulses:  2+ and symmetric all extremities    Skin:  No jaundice, rashes, or lesions    Lymph nodes:  No palpable cervical lymphadenopathy        Lab Results:   No visits with results within 1 Day(s) from this visit.   Latest known visit with results is:   Appointment on 10/03/2024   Component Date Value    Cholesterol 10/03/2024 94     Triglycerides 10/03/2024 112     HDL, Direct 10/03/2024 42     LDL Calculated 10/03/2024 30     Non-HDL-Chol (CHOL-HDL) 10/03/2024 52     WBC 10/03/2024 12.06 (H)     RBC 10/03/2024 4.29     Hemoglobin 10/03/2024 14.7     Hematocrit 10/03/2024 44.0     MCV 10/03/2024 103 (H)     MCH 10/03/2024 34.3     MCHC 10/03/2024 33.4     RDW 10/03/2024 12.9     Platelets 10/03/2024 203     MPV 10/03/2024 10.2     Sodium 10/03/2024 139     Potassium 10/03/2024 3.9     Chloride 10/03/2024 104     CO2 10/03/2024 30     ANION GAP 10/03/2024 5     BUN 10/03/2024 9     Creatinine 10/03/2024 0.64     Glucose, Fasting 10/03/2024 137 (H)     Calcium 10/03/2024 9.3     AST 10/03/2024 22     ALT 10/03/2024 23     Alkaline " Phosphatase 10/03/2024 51     Total Protein 10/03/2024 7.5     Albumin 10/03/2024 4.1     Total Bilirubin 10/03/2024 1.24 (H)     eGFR 10/03/2024 94     Magnesium 10/03/2024 2.0     Bilirubin, Direct 10/03/2024 0.27 (H)        Imaging Studies: I have personally reviewed pertinent imaging studies.

## 2024-10-29 NOTE — ED PROVIDER NOTES
Time reflects when diagnosis was documented in both MDM as applicable and the Disposition within this note       Time User Action Codes Description Comment    10/29/2024  6:46 PM Cathleen Owen Add [T18.128A,  W44.F3XA] Food impaction of esophagus, initial encounter     10/29/2024  9:10 PM Gómez Palacios Add [T18.128A,  W44.F3XA] Esophageal obstruction due to food impaction           ED Disposition       ED Disposition   Discharge    Condition   Stable    Date/Time   Tue Oct 29, 2024  9:10 PM    Comment   Carlyle Mora discharge to home/self care.                   Assessment & Plan       Medical Decision Making    MDM/DDx: Globus sensation - food impaction, esophageal web, esophageal stricture, no clinical evidence of respiratory compromise or deglutition problem.     I independently reviewed and interpreted ordered imaging from this encounter.    A/P: Will check CXR for air-fluid level, treat symptoms with glucagon and nitroglycerin, reevaluate for further work up, need for GI consultation and disposition.    Amount and/or Complexity of Data Reviewed  Labs: ordered.  Radiology: ordered and independent interpretation performed.    Risk  Prescription drug management.        ED Course as of 10/30/24 1409   Tue Oct 29, 2024   1820 Patient feeling a little bit of relief after meds, able to spit up some pieces of the pulled pork. Will try to drink cola.    1853 GI team is en route to evaluate the patient.  Anesthesia may be delayed due to Trump rally.   1948 GI planning OR in 45 minutes       Medications   glucagon (GLUCAGEN) injection 4 mg (4 mg Intravenous Given 10/29/24 1805)       ED Risk Strat Scores                           SBIRT 22yo+      Flowsheet Row Most Recent Value   Initial Alcohol Screen: US AUDIT-C     1. How often do you have a drink containing alcohol? 0 Filed at: 10/29/2024 1705   2. How many drinks containing alcohol do you have on a typical day you are drinking?  0 Filed at: 10/29/2024 1705    3b. FEMALE Any Age, or MALE 65+: How often do you have 4 or more drinks on one occassion? 0 Filed at: 10/29/2024 1705   Audit-C Score 0 Filed at: 10/29/2024 1705   MICHAEL: How many times in the past year have you...    Used an illegal drug or used a prescription medication for non-medical reasons? Never Filed at: 10/29/2024 1705                            History of Present Illness       Chief Complaint   Patient presents with    Foreign Body in Throat     Pt REPORTS EATING PULLED PORK AND HAVING IT STUCK IN HIS THROAT         Past Medical History:   Diagnosis Date    Cancer (HCC)     colon    Hyperlipidemia     Hypertension     Myocardial infarction (HCC)     Ventral hernia       Past Surgical History:   Procedure Laterality Date    COLON SURGERY      colon resection    CORONARY ARTERY BYPASS GRAFT      x5    HI REPAIR FIRST ABDOMINAL WALL HERNIA N/A 3/2/2018    Procedure: REPAIR HERNIA VENTRAL WITH MESH;  Surgeon: Rafal Chase MD;  Location: [facility];  Service: General      History reviewed. No pertinent family history.   Social History     Tobacco Use    Smoking status: Former     Current packs/day: 0.00     Types: Cigarettes     Quit date:      Years since quittin.8    Smokeless tobacco: Never   Substance Use Topics    Alcohol use: Yes     Alcohol/week: 12.0 standard drinks of alcohol     Types: 12 Cans of beer per week      E-Cigarette/Vaping      E-Cigarette/Vaping Substances      I have reviewed and agree with the history as documented.     79-year-old male presents via EMS for evaluation of food impaction.  Approximately 2 hours prior to arrival, the patient was eating a pulled pork sandwich, finished it and then was unable to swallow a drink of water thereafter.  He feels a globus sensation at his suprasternal notch and slightly distal, under the sternum.  He is unable to swallow his oral secretions and is gagging/bringing them up periodically.  He had no choking and has no respiratory  complaint.  He has no history of this occurring previously.  He has never had esophageal dilation.  He has no known esophageal webs or strictures.  He thinks he had an endoscopy with one of his colonoscopies during treatment for his colon cancer.  He has not had any radiation to his chest or throat.  He did have CABG.    No recent travel or sick contacts. Denies f/c, HA, SOB, abdominal pain, n/v/d. 12 system ROS o/w negative.          History provided by:  Patient, medical records and EMS personnel  Foreign Body in Throat  Location:  Swallowed  Suspected object:  Food  Pain quality:  Pressure  Pain severity:  No pain  Duration:  2 hours  Timing:  Constant  Progression:  Unchanged  Chronicity:  New  Worsened by:  Drinking and eating  Ineffective treatments:  Drinking  Associated symptoms: drooling and trouble swallowing    Associated symptoms: no abdominal pain, no choking, no congestion, no cough, no difficulty breathing, no nasal discharge, no nausea and no vomiting    Risk factors: no developmental delay, no hx of esophageal strictures, no mental health problem, no prior similar events and no prior surgery to area        Review of Systems   Constitutional:  Negative for chills and fever.   HENT:  Positive for drooling and trouble swallowing. Negative for congestion.    Eyes: Negative.    Respiratory:  Negative for cough, choking and shortness of breath.    Cardiovascular:  Negative for chest pain and palpitations.   Gastrointestinal:  Negative for abdominal pain, diarrhea, nausea and vomiting.   Genitourinary: Negative.    Musculoskeletal:  Negative for back pain and neck pain.   Neurological: Negative.  Negative for syncope and light-headedness.   Psychiatric/Behavioral: Negative.     All other systems reviewed and are negative.          Objective       ED Triage Vitals [10/29/24 1702]   Temperature Pulse Blood Pressure Respirations SpO2 Patient Position - Orthostatic VS   (!) 97.1 °F (36.2 °C) 63 104/55 18 93 %  Lying      Temp Source Heart Rate Source BP Location FiO2 (%) Pain Score    Temporal Monitor Left arm -- No Pain      Vitals      Date and Time Temp Pulse SpO2 Resp BP Pain Score FACES Pain Rating User   10/29/24 2300 -- 77 92 % -- -- -- -- TG   10/29/24 2145 -- 66 94 % 18 117/64 No Pain -- DAH   10/29/24 2130 98 °F (36.7 °C) 66 97 % 18 129/68 -- -- DAH   10/29/24 1956 98 °F (36.7 °C) 76 95 % 22 128/66 No Pain -- DAH   10/29/24 1845 -- 66 97 % 24 133/61 -- -- CARA   10/29/24 1830 -- 67 93 % 20 142/70 -- -- CARA   10/29/24 1820 -- 69 94 % 20 142/70 -- -- CARA   10/29/24 1818 -- 73 95 % 22 138/70 -- -- CARA   10/29/24 1810 -- 71 94 % 20 119/56 -- -- CARA   10/29/24 1800 -- 64 95 % 18 151/70 -- -- CARA   10/29/24 1730 -- 64 94 % -- 145/70 -- -- CARA   10/29/24 1704 -- -- 94 % -- -- -- --    10/29/24 1702 97.1 °F (36.2 °C) 63 93 % 18 104/55 No Pain --             Physical Exam  Vitals reviewed.   Constitutional:       General: He is in acute distress (mild).      Appearance: He is well-developed. He is not toxic-appearing.   HENT:      Head: Normocephalic and atraumatic.      Right Ear: External ear normal.      Left Ear: External ear normal.      Nose: Nose normal.      Mouth/Throat:      Mouth: Mucous membranes are moist.      Pharynx: Oropharynx is clear.   Eyes:      General: No scleral icterus.     Conjunctiva/sclera: Conjunctivae normal.      Pupils: Pupils are equal, round, and reactive to light.   Cardiovascular:      Rate and Rhythm: Normal rate and regular rhythm.      Heart sounds: No murmur heard.  Pulmonary:      Effort: Pulmonary effort is normal. No respiratory distress.      Breath sounds: Normal breath sounds.   Abdominal:      General: Abdomen is flat. Bowel sounds are normal. There is no distension.      Palpations: Abdomen is soft.      Tenderness: There is no abdominal tenderness. There is no guarding or rebound.   Musculoskeletal:      Cervical back: Normal range of motion and neck supple.   Skin:      General: Skin is warm and dry.   Neurological:      General: No focal deficit present.      Mental Status: He is alert and oriented to person, place, and time.   Psychiatric:         Behavior: Behavior normal.         Thought Content: Thought content normal.         Results Reviewed       Procedure Component Value Units Date/Time    Fingerstick Glucose (POCT) [[id number]]  (Abnormal) Collected: 10/29/24 2017    Lab Status: Final result Specimen: Blood Updated: 10/29/24 2018     POC Glucose 165 mg/dl             XR chest 2 views   ED Interpretation by Gómez Palacios DO (10/29 8551)   No acute cardiopulmonary pathology      Final Interpretation by Rodriguez Sheets MD (10/30 6554)      No acute cardiopulmonary disease.            Workstation performed: [id number]             Procedures    ED Medication and Procedure Management   Prior to Admission Medications   Prescriptions Last Dose Informant Patient Reported? Taking?   AMLODIPINE BESYLATE PO 10/29/2024 at 0700 Self Yes Yes   Sig: Take 5 mg by mouth daily   Calcium Carbonate-Vitamin D (CALCIUM 600+D PO) 10/29/2024 Self Yes Yes   Sig: Take 1 tablet by mouth daily   Multiple Vitamins-Iron (MULTIVITAMIN/IRON PO) 10/29/2024 Self Yes Yes   Sig: Take 1 tablet by mouth daily   Omega-3 Fatty Acids (FISH OIL) 1000 MG CPDR 10/29/2024 Self Yes Yes   Sig: Take 1 capsule by mouth daily   aspirin (ECOTRIN LOW STRENGTH) 81 mg EC tablet 10/29/2024 at 0700 Self Yes Yes   Sig: Take 81 mg by mouth daily   meclizine (ANTIVERT) 25 mg tablet  Self No No   Sig: Take 1 tablet (25 mg total) by mouth every 8 (eight) hours   Patient not taking: Reported on 9/4/2018    metFORMIN (GLUCOPHAGE) 500 mg tablet 10/29/2024 at 0700  Yes Yes   Sig: Take 500 mg by mouth 2 (two) times a day with meals Morning and evening   metoprolol tartrate (LOPRESSOR) 50 mg tablet 10/29/2024 at 0700 Self Yes Yes   Sig: Take 75 mg by mouth every 12 (twelve) hours   nitroglycerin (NITROSTAT) 0.3 mg SL tablet Unknown  Self No No   Sig: Place 1 tablet (0.3 mg total) under the tongue every 5 (five) minutes as needed for chest pain   quinapril (ACCUPRIL) 20 mg tablet 10/29/2024 at 0700 Self Yes Yes   Sig: Take 20 mg by mouth daily   rosuvastatin (CRESTOR) 10 MG tablet 10/29/2024 at 0700 Self Yes Yes   Sig: Take 10 mg by mouth daily      Facility-Administered Medications: None     Discharge Medication List as of 10/29/2024 11:07 PM        START taking these medications    Details   omeprazole (PriLOSEC) 20 mg delayed release capsule Take 2 capsules (40 mg total) by mouth 2 (two) times a day for 20 days, Starting Tue 10/29/2024, Until Mon 11/18/2024, Normal           CONTINUE these medications which have NOT CHANGED    Details   AMLODIPINE BESYLATE PO Take 5 mg by mouth daily, Historical Med      aspirin (ECOTRIN LOW STRENGTH) 81 mg EC tablet Take 81 mg by mouth daily, Historical Med      Calcium Carbonate-Vitamin D (CALCIUM 600+D PO) Take 1 tablet by mouth daily, Historical Med      metFORMIN (GLUCOPHAGE) 500 mg tablet Take 500 mg by mouth 2 (two) times a day with meals Morning and evening, Historical Med      metoprolol tartrate (LOPRESSOR) 50 mg tablet Take 75 mg by mouth every 12 (twelve) hours, Historical Med      Multiple Vitamins-Iron (MULTIVITAMIN/IRON PO) Take 1 tablet by mouth daily, Historical Med      Omega-3 Fatty Acids (FISH OIL) 1000 MG CPDR Take 1 capsule by mouth daily, Historical Med      quinapril (ACCUPRIL) 20 mg tablet Take 20 mg by mouth daily, Historical Med      rosuvastatin (CRESTOR) 10 MG tablet Take 10 mg by mouth daily, Historical Med      meclizine (ANTIVERT) 25 mg tablet Take 1 tablet (25 mg total) by mouth every 8 (eight) hours, Starting Sat 4/21/2018, Print      nitroglycerin (NITROSTAT) 0.3 mg SL tablet Place 1 tablet (0.3 mg total) under the tongue every 5 (five) minutes as needed for chest pain, Starting Sat 4/21/2018, Print           No discharge procedures on file.  ED SEPSIS DOCUMENTATION   Time  reflects when diagnosis was documented in both MDM as applicable and the Disposition within this note       Time User Action Codes Description Comment    10/29/2024  6:46 PM Cathleen Owen Add [T18.128A,  W44.F3XA] Food impaction of esophagus, initial encounter     10/29/2024  9:10 PM Gómez Palacios Add [T18.128A,  W44.F3XA] Esophageal obstruction due to food impaction                  Gómez Palacios DO  10/30/24 1402

## 2024-10-30 NOTE — ED CARE HANDOFF
Emergency Department Sign Out Note        Sign out and transfer of care from Dr. Palacios. See Separate Emergency Department note.     The patient, Carlyle Mora, was evaluated by the previous provider for esophageal obstruction.    Workup Completed:  Chest x-ray    ED Course / Workup Pending (followup):  Patient presents with esophageal obstruction.  Relieved after EGD, handling p.o. prior to discharge.  Return to baseline, started on Prilosec at GIs direction with follow-up given.                                  ED Course as of 10/29/24 2313   Tue Oct 29, 2024   2120 Food impaction, in OR with GI, likely back and discharge    2156 Back from OR, will observe for another half hour      Procedures  Medical Decision Making  Amount and/or Complexity of Data Reviewed  Labs: ordered.  Radiology: ordered and independent interpretation performed.    Risk  Prescription drug management.            Disposition  Final diagnoses:   Esophageal obstruction due to food impaction     Time reflects when diagnosis was documented in both MDM as applicable and the Disposition within this note       Time User Action Codes Description Comment    10/29/2024  6:46 PM Ctahleen Owen Add [T18.128A,  W44.F3XA] Food impaction of esophagus, initial encounter     10/29/2024  9:10 PM Gómez Palacios Add [T18.128A,  W44.F3XA] Esophageal obstruction due to food impaction           ED Disposition       ED Disposition   Discharge    Condition   Stable    Date/Time   Tue Oct 29, 2024  9:10 PM    Comment   Carlyle Mora discharge to home/self care.                   Follow-up Information       Follow up With Specialties Details Why Contact Info    Truong Hernandez DO Family Medicine Go to  as needed [address] [place] [address]  [place] [address] [phone]      Truong Roth DO Gastroenterology, Internal Medicine Schedule an appointment as soon as possible for a visit  if symptoms do not improve [address] [facility]  [address]  [place]  [address]  [phone]            Discharge Medication List as of 10/29/2024 11:07 PM        START taking these medications    Details   omeprazole (PriLOSEC) 20 mg delayed release capsule Take 2 capsules (40 mg total) by mouth 2 (two) times a day for 20 days, Starting Tue 10/29/2024, Until Mon 11/18/2024, Normal           CONTINUE these medications which have NOT CHANGED    Details   AMLODIPINE BESYLATE PO Take 5 mg by mouth daily, Historical Med      aspirin (ECOTRIN LOW STRENGTH) 81 mg EC tablet Take 81 mg by mouth daily, Historical Med      Calcium Carbonate-Vitamin D (CALCIUM 600+D PO) Take 1 tablet by mouth daily, Historical Med      metFORMIN (GLUCOPHAGE) 500 mg tablet Take 500 mg by mouth 2 (two) times a day with meals Morning and evening, Historical Med      metoprolol tartrate (LOPRESSOR) 50 mg tablet Take 75 mg by mouth every 12 (twelve) hours, Historical Med      Multiple Vitamins-Iron (MULTIVITAMIN/IRON PO) Take 1 tablet by mouth daily, Historical Med      Omega-3 Fatty Acids (FISH OIL) 1000 MG CPDR Take 1 capsule by mouth daily, Historical Med      quinapril (ACCUPRIL) 20 mg tablet Take 20 mg by mouth daily, Historical Med      rosuvastatin (CRESTOR) 10 MG tablet Take 10 mg by mouth daily, Historical Med      meclizine (ANTIVERT) 25 mg tablet Take 1 tablet (25 mg total) by mouth every 8 (eight) hours, Starting Sat 4/21/2018, Print      nitroglycerin (NITROSTAT) 0.3 mg SL tablet Place 1 tablet (0.3 mg total) under the tongue every 5 (five) minutes as needed for chest pain, Starting Sat 4/21/2018, Print           No discharge procedures on file.       ED Provider  Electronically Signed by     Tommy Dale MD  10/29/24 7915

## 2024-10-30 NOTE — ANESTHESIA POSTPROCEDURE EVALUATION
Post-Op Assessment Note    CV Status:  Stable    Pain management: adequate       Mental Status:  Sleepy   Hydration Status:  Euvolemic   PONV Controlled:  Controlled   Airway Patency:  Patent     Post Op Vitals Reviewed: Yes    No anethesia notable event occurred.    Staff: Anesthesiologist           Last Filed PACU Vitals:  Vitals Value Taken Time   Temp     Pulse 67    /78    Resp 21    SpO2 97%        Modified Nicol:  No data recorded

## 2024-10-30 NOTE — ANESTHESIA PREPROCEDURE EVALUATION
Procedure:  EGD    Relevant Problems   CARDIO   (+) Essential hypertension   (+) HLD (hyperlipidemia)   (+) Precordial pain      Neurology/Sleep   (+) Cerebral amyloid angiopathy (CODE)      Surgery/Wound/Pain   (+) H/O hernia repair   (+) Hx of CABG      Other   (+) Dizziness and giddiness      Lab Results   Component Value Date    SODIUM 139 10/03/2024    K 3.9 10/03/2024     10/03/2024    CO2 30 10/03/2024    AGAP 5 10/03/2024    BUN 9 10/03/2024    CREATININE 0.64 10/03/2024    GLUC 254 (H) 03/21/2024    GLUF 137 (H) 10/03/2024    CALCIUM 9.3 10/03/2024    AST 22 10/03/2024    ALT 23 10/03/2024    ALKPHOS 51 10/03/2024    TP 7.5 10/03/2024    TBILI 1.24 (H) 10/03/2024    EGFR 94 10/03/2024     Lab Results   Component Value Date    WBC 12.06 (H) 10/03/2024    HGB 14.7 10/03/2024    HCT 44.0 10/03/2024     (H) 10/03/2024     10/03/2024           Physical Exam    Airway    Mallampati score: II  TM Distance: >3 FB  Neck ROM: limited     Dental   Comment: edentulous     Cardiovascular      Pulmonary      Other Findings        Anesthesia Plan  ASA Score- 3     Anesthesia Type- general with ASA Monitors.         Additional Monitors:     Airway Plan:            Plan Factors-Exercise tolerance (METS): >4 METS.    Chart reviewed. EKG reviewed. Imaging results reviewed. Existing labs reviewed. Patient summary reviewed.                  Induction- intravenous.    Postoperative Plan-         Informed Consent- Anesthetic plan and risks discussed with patient.  I personally reviewed this patient with the CRNA. Discussed and agreed on the Anesthesia Plan with the CRNA..

## 2024-11-01 NOTE — ANESTHESIA POSTPROCEDURE EVALUATION
Post-Op Assessment Note    CV Status:  Stable    Pain management: adequate       Mental Status:  Alert and awake   Hydration Status:  Euvolemic   PONV Controlled:  Controlled   Airway Patency:  Patent     Post Op Vitals Reviewed: Yes    No anethesia notable event occurred.    Staff: Anesthesiologist           Last Filed PACU Vitals:  Vitals Value Taken Time   Temp 98 °F (36.7 °C) 10/29/24 2130   Pulse 74 10/29/24 2153   /64 10/29/24 2145   Resp 18 10/29/24 2145   SpO2 97 % 10/29/24 2153   Vitals shown include unfiled device data.    Modified Nicol:  No data recorded

## 2024-11-28 ENCOUNTER — APPOINTMENT (EMERGENCY)
Dept: RADIOLOGY | Facility: HOSPITAL | Age: 79
End: 2024-11-28
Payer: MEDICARE

## 2024-11-28 ENCOUNTER — HOSPITAL ENCOUNTER (EMERGENCY)
Facility: HOSPITAL | Age: 79
Discharge: HOME | End: 2024-11-28
Attending: STUDENT IN AN ORGANIZED HEALTH CARE EDUCATION/TRAINING PROGRAM
Payer: MEDICARE

## 2024-11-28 VITALS
HEIGHT: 68 IN | BODY MASS INDEX: 28.04 KG/M2 | OXYGEN SATURATION: 93 % | RESPIRATION RATE: 20 BRPM | WEIGHT: 185 LBS | TEMPERATURE: 98.1 F | HEART RATE: 63 BPM | SYSTOLIC BLOOD PRESSURE: 126 MMHG | DIASTOLIC BLOOD PRESSURE: 63 MMHG
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
## 2024-11-28 DIAGNOSIS — K56.699 FOOD BOLUS OBSTRUCTION OF INTESTINE  (HCC): Primary | ICD-10-CM

## 2024-11-28 LAB
ANION GAP SERPL CALCULATED.3IONS-SCNC: 8 MMOL/L (ref 4–13)
BASOPHILS # BLD AUTO: 0.05 THOUSANDS/ΜL (ref 0–0.1)
BASOPHILS NFR BLD AUTO: 1 % (ref 0–1)
BUN SERPL-MCNC: 16 MG/DL (ref 5–25)
CALCIUM SERPL-MCNC: 9.8 MG/DL (ref 8.4–10.2)
CHLORIDE SERPL-SCNC: 103 MMOL/L (ref 96–108)
CO2 SERPL-SCNC: 30 MMOL/L (ref 21–32)
CREAT SERPL-MCNC: 0.69 MG/DL (ref 0.6–1.3)
EOSINOPHIL # BLD AUTO: 0.29 THOUSAND/ΜL (ref 0–0.61)
EOSINOPHIL NFR BLD AUTO: 4 % (ref 0–6)
ERYTHROCYTE [DISTWIDTH] IN BLOOD BY AUTOMATED COUNT: 12.5 % (ref 11.6–15.1)
GFR SERPL CREATININE-BSD FRML MDRD: 91 ML/MIN/1.73SQ M
GLUCOSE SERPL-MCNC: 113 MG/DL (ref 65–140)
GLUCOSE SERPL-MCNC: 117 MG/DL (ref 65–140)
HCT VFR BLD AUTO: 43.4 % (ref 36.5–49.3)
HGB BLD-MCNC: 14.6 G/DL (ref 12–17)
IMM GRANULOCYTES # BLD AUTO: 0.02 THOUSAND/UL (ref 0–0.2)
IMM GRANULOCYTES NFR BLD AUTO: 0 % (ref 0–2)
LYMPHOCYTES # BLD AUTO: 1.96 THOUSANDS/ΜL (ref 0.6–4.47)
LYMPHOCYTES NFR BLD AUTO: 24 % (ref 14–44)
MCH RBC QN AUTO: 34.3 PG (ref 26.8–34.3)
MCHC RBC AUTO-ENTMCNC: 33.6 G/DL (ref 31.4–37.4)
MCV RBC AUTO: 102 FL (ref 82–98)
MONOCYTES # BLD AUTO: 0.79 THOUSAND/ΜL (ref 0.17–1.22)
MONOCYTES NFR BLD AUTO: 10 % (ref 4–12)
NEUTROPHILS # BLD AUTO: 4.92 THOUSANDS/ΜL (ref 1.85–7.62)
NEUTS SEG NFR BLD AUTO: 61 % (ref 43–75)
NRBC BLD AUTO-RTO: 0 /100 WBCS
PLATELET # BLD AUTO: 195 THOUSANDS/UL (ref 149–390)
PMV BLD AUTO: 10.3 FL (ref 8.9–12.7)
POTASSIUM SERPL-SCNC: 4 MMOL/L (ref 3.5–5.3)
RBC # BLD AUTO: 4.26 MILLION/UL (ref 3.88–5.62)
SODIUM SERPL-SCNC: 141 MMOL/L (ref 135–147)
WBC # BLD AUTO: 8.03 THOUSAND/UL (ref 4.31–10.16)

## 2024-11-28 PROCEDURE — 82948 REAGENT STRIP/BLOOD GLUCOSE: CPT

## 2024-11-28 PROCEDURE — 71046 X-RAY EXAM CHEST 2 VIEWS: CPT

## 2024-11-28 PROCEDURE — 80048 BASIC METABOLIC PNL TOTAL CA: CPT | Performed by: STUDENT IN AN ORGANIZED HEALTH CARE EDUCATION/TRAINING PROGRAM

## 2024-11-28 PROCEDURE — 99283 EMERGENCY DEPT VISIT LOW MDM: CPT

## 2024-11-28 PROCEDURE — 85025 COMPLETE CBC W/AUTO DIFF WBC: CPT | Performed by: STUDENT IN AN ORGANIZED HEALTH CARE EDUCATION/TRAINING PROGRAM

## 2024-11-28 PROCEDURE — 96361 HYDRATE IV INFUSION ADD-ON: CPT

## 2024-11-28 PROCEDURE — 99285 EMERGENCY DEPT VISIT HI MDM: CPT | Performed by: STUDENT IN AN ORGANIZED HEALTH CARE EDUCATION/TRAINING PROGRAM

## 2024-11-28 PROCEDURE — 36415 COLL VENOUS BLD VENIPUNCTURE: CPT | Performed by: STUDENT IN AN ORGANIZED HEALTH CARE EDUCATION/TRAINING PROGRAM

## 2024-11-28 PROCEDURE — 96374 THER/PROPH/DIAG INJ IV PUSH: CPT

## 2024-11-28 RX ORDER — NITROGLYCERIN 0.4 MG/1
0.4 TABLET SUBLINGUAL ONCE
Status: COMPLETED | OUTPATIENT
Start: 2024-11-28 | End: 2024-11-28

## 2024-11-28 RX ADMIN — SODIUM CHLORIDE 1000 ML: 0.9 INJECTION, SOLUTION INTRAVENOUS at 13:31

## 2024-11-28 RX ADMIN — NITROGLYCERIN 0.4 MG: 0.4 TABLET SUBLINGUAL at 13:33

## 2024-11-28 RX ADMIN — GLUCAGON 4 MG: KIT at 13:25

## 2024-11-28 NOTE — ED PROVIDER NOTES
CC:  Luul Mosquera is here today for   Chief Complaint   Patient presents with   • Establish Care     new patient     Medications: medications verified and updated  Refills needed today?  NO  Denies known Latex allergy or symptoms of Latex sensitivity.  Patient would like communication of their results via:    [facility]    Cell Phone:   Telephone Information:   Mobile [phone]   Mobile Not on file.     Okay to leave a message containing results? Yes  Tobacco history: verified    Health Maintenance Due   Topic Date Due   • Pneumococcal 19-64 Medium Risk (1 of 1 - PPSV23) 02/03/2013     Patient is due for the topics as listed above and wishes to proceed with them.     [facility] status addressed. Patient Active.            Time reflects when diagnosis was documented in both MDM as applicable and the Disposition within this note       Time User Action Codes Description Comment    11/28/2024  2:22 PM Luther Jensen Add [K56.699,  W44.F3XA] Food bolus obstruction of intestine  (HCC)           ED Disposition       ED Disposition   Discharge    Condition   Stable    Date/Time   Thu Nov 28, 2024  2:52 PM    Comment   Carlyle Mora discharge to home/self care.                   Assessment & Plan       Medical Decision Making  Problems Addressed:  Food bolus obstruction of intestine  (HCC): acute illness or injury    Amount and/or Complexity of Data Reviewed  Independent Historian: parent  External Data Reviewed: labs and radiology.  Labs: ordered. Decision-making details documented in ED Course.  Radiology: ordered and independent interpretation performed. Decision-making details documented in ED Course.  ECG/medicine tests:  Decision-making details documented in ED Course.    Risk  Prescription drug management.        ED Course as of 11/28/24 1454   Th Nov 28, 2024   1325 Secure chat sent to on-call GI to discuss   1329 POC Glucose: 117   1340 Patient reevaluated no improvement will reach back out to GI   1410 GI responding states they are rounding at a neighboring campus but will review chart shortly   1421 Patient reevaluated states he thinks he passed the food bolus feels much better no longer has a globus sensation was drinking soda upon my reevaluation.  Will plan to monitor him for interval period of time and anticipate discharge.  Additionally I did update GI   1453 Patient reevaluated states he feels great would like to go home.  Clinically he is back at his baseline.       Medications   glucagon (GLUCAGEN) injection 4 mg (4 mg Intravenous Given 11/28/24 1325)   sodium chloride 0.9 % bolus 1,000 mL (1,000 mL Intravenous New Bag 11/28/24 1331)   nitroglycerin (NITROSTAT) SL tablet 0.4 mg (0.4 mg Sublingual Given 11/28/24 1333)        ED Risk Strat Scores                           SBIRT 20yo+      Flowsheet Row Most Recent Value   Initial Alcohol Screen: US AUDIT-C     1. How often do you have a drink containing alcohol? 0 Filed at: 2024   2. How many drinks containing alcohol do you have on a typical day you are drinking?  0 Filed at: 2024   3a. Male UNDER 65: How often do you have five or more drinks on one occasion? 0 Filed at: 2024   3b. FEMALE Any Age, or MALE 65+: How often do you have 4 or more drinks on one occassion? 0 Filed at: 2024   Audit-C Score 0 Filed at: 2024   MICHAEL: How many times in the past year have you...    Used an illegal drug or used a prescription medication for non-medical reasons? Never Filed at: 2024                            History of Present Illness       Chief Complaint   Patient presents with    Medical Problem     Pt was eating a clam and he feels it is stuck in his throat       Past Medical History:   Diagnosis Date    Cancer (HCC)     colon    Hyperlipidemia     Hypertension     Myocardial infarction (HCC)     Ventral hernia       Past Surgical History:   Procedure Laterality Date    COLON SURGERY      colon resection    CORONARY ARTERY BYPASS GRAFT      x5    GA REPAIR FIRST ABDOMINAL WALL HERNIA N/A 3/2/2018    Procedure: REPAIR HERNIA VENTRAL WITH MESH;  Surgeon: Rafal Chase MD;  Location: [facility];  Service: General      History reviewed. No pertinent family history.   Social History     Tobacco Use    Smoking status: Former     Current packs/day: 0.00     Types: Cigarettes     Quit date:      Years since quittin.9    Smokeless tobacco: Never   Substance Use Topics    Alcohol use: Yes     Alcohol/week: 12.0 standard drinks of alcohol     Types: 12 Cans of beer per week      E-Cigarette/Vaping      E-Cigarette/Vaping Substances      I have reviewed and agree with the history as documented.     This is 79-year-old male  presents to the emergency department with globus sensation.  Patient states he was in clams this morning and feels like 1 is stuck in his throat.  Patient reports this happened approximately 1 hour ago and since then he has been spitting up saliva/mucus but is not able to relieve the sensation in his throat.  He denies any other symptoms.  Of note he had a similar presentation approximately 3 weeks ago where he had a large food bolus that had to be disimpacted via EGD.      History provided by:  Spouse  Medical Problem  Associated symptoms: nausea    Associated symptoms: no abdominal pain, no chest pain, no congestion, no cough, no diarrhea, no ear pain, no fatigue, no fever, no headaches, no myalgias, no rhinorrhea and no shortness of breath        Review of Systems   Constitutional:  Negative for activity change, appetite change, chills, fatigue and fever.   HENT:  Negative for congestion, dental problem, drooling, ear discharge, ear pain, facial swelling, postnasal drip, rhinorrhea and sinus pain.    Eyes:  Negative for photophobia, pain, discharge and itching.   Respiratory:  Negative for apnea, cough, chest tightness and shortness of breath.    Cardiovascular:  Negative for chest pain and leg swelling.   Gastrointestinal:  Positive for nausea. Negative for abdominal distention, abdominal pain, anal bleeding, constipation and diarrhea.   Endocrine: Negative for cold intolerance, heat intolerance and polydipsia.   Genitourinary:  Negative for difficulty urinating.   Musculoskeletal:  Negative for arthralgias, gait problem, joint swelling and myalgias.   Skin:  Negative for color change and pallor.   Allergic/Immunologic: Negative for immunocompromised state.   Neurological:  Negative for dizziness, seizures, facial asymmetry, weakness, light-headedness, numbness and headaches.   Psychiatric/Behavioral:  Negative for agitation, behavioral problems, confusion, decreased concentration and dysphoric mood.    All  other systems reviewed and are negative.          Objective       ED Triage Vitals [11/28/24 1316]   Temperature Pulse Blood Pressure Respirations SpO2 Patient Position - Orthostatic VS   98.1 °F (36.7 °C) 70 (!) 176/87 20 96 % Sitting      Temp Source Heart Rate Source BP Location FiO2 (%) Pain Score    Temporal Monitor Left arm -- No Pain      Vitals      Date and Time Temp Pulse SpO2 Resp BP Pain Score FACES Pain Rating User   11/28/24 1400 -- 61 94 % 20 121/66 -- -- AMF   11/28/24 1330 -- 64 94 % 20 176/87 -- -- [facility]   11/28/24 1316 98.1 °F (36.7 °C) 70 96 % 20 176/87 No Pain -- AMF            Physical Exam  Vitals reviewed.   Constitutional:       Appearance: He is well-developed.      Comments: Mild distress   HENT:      Head: Normocephalic.      Mouth/Throat:      Comments: Excessive salivation  Eyes:      Pupils: Pupils are equal, round, and reactive to light.   Cardiovascular:      Rate and Rhythm: Normal rate and regular rhythm.   Pulmonary:      Effort: Pulmonary effort is normal.      Breath sounds: Normal breath sounds.   Abdominal:      General: Bowel sounds are normal.      Palpations: Abdomen is soft.   Musculoskeletal:         General: Normal range of motion.      Cervical back: Normal range of motion and neck supple.   Skin:     General: Skin is warm.         Results Reviewed       Procedure Component Value Units Date/Time    Basic metabolic panel [[id number]] Collected: 11/28/24 1322    Lab Status: Final result Specimen: Blood from Arm, Right Updated: 11/28/24 1341     Sodium 141 mmol/L      Potassium 4.0 mmol/L      Chloride 103 mmol/L      CO2 30 mmol/L      ANION GAP 8 mmol/L      BUN 16 mg/dL      Creatinine 0.69 mg/dL      Glucose 113 mg/dL      Calcium 9.8 mg/dL      eGFR 91 ml/min/1.73sq m     Narrative:      National Kid[employer] Disease Foundation guidelines for Chronic Kidney Disease (CKD):     Stage 1 with normal or high GFR (GFR > 90 mL/min/1.73 square meters)    Stage 2 Mild CKD (GFR = 60-89  mL/min/1.73 square meters)    Stage 3A Moderate CKD (GFR = 45-59 mL/min/1.73 square meters)    Stage 3B Moderate CKD (GFR = 30-44 mL/min/1.73 square meters)    Stage 4 Severe CKD (GFR = 15-29 mL/min/1.73 square meters)    Stage 5 End Stage CKD (GFR <15 mL/min/1.73 square meters)  Note: GFR calculation is accurate only with a steady state creatinine    CBC and differential [[id number]]  (Abnormal) Collected: 11/28/24 1322    Lab Status: Final result Specimen: Blood from Arm, Right Updated: 11/28/24 1328     WBC 8.03 Thousand/uL      RBC 4.26 Million/uL      Hemoglobin 14.6 g/dL      Hematocrit 43.4 %       fL      MCH 34.3 pg      MCHC 33.6 g/dL      RDW 12.5 %      MPV 10.3 fL      Platelets 195 Thousands/uL      nRBC 0 /100 WBCs      Segmented % 61 %      Immature Grans % 0 %      Lymphocytes % 24 %      Monocytes % 10 %      Eosinophils Relative 4 %      Basophils Relative 1 %      Absolute Neutrophils 4.92 Thousands/µL      Absolute Immature Grans 0.02 Thousand/uL      Absolute Lymphocytes 1.96 Thousands/µL      Absolute Monocytes 0.79 Thousand/µL      Eosinophils Absolute 0.29 Thousand/µL      Basophils Absolute 0.05 Thousands/µL     Fingerstick Glucose (POCT) [[id number]]  (Normal) Collected: 11/28/24 1325    Lab Status: Final result Specimen: Blood Updated: 11/28/24 1326     POC Glucose 117 mg/dl             XR chest 2 views   Final Interpretation by Emily Ortiz MD (11/28 8673)      No acute cardiopulmonary disease.               Workstation performed: [id number]             Procedures    ED Medication and Procedure Management   Prior to Admission Medications   Prescriptions Last Dose Informant Patient Reported? Taking?   AMLODIPINE BESYLATE PO  Self Yes No   Sig: Take 5 mg by mouth daily   Calcium Carbonate-Vitamin D (CALCIUM 600+D PO)  Self Yes No   Sig: Take 1 tablet by mouth daily   Multiple Vitamins-Iron (MULTIVITAMIN/IRON PO)  Self Yes No   Sig: Take 1 tablet by mouth daily   Omega-3 Fatty  Acids (FISH OIL) 1000 MG CPDR  Self Yes No   Sig: Take 1 capsule by mouth daily   aspirin (ECOTRIN LOW STRENGTH) 81 mg EC tablet  Self Yes No   Sig: Take 81 mg by mouth daily   meclizine (ANTIVERT) 25 mg tablet  Self No No   Sig: Take 1 tablet (25 mg total) by mouth every 8 (eight) hours   Patient not taking: Reported on 9/4/2018    metFORMIN (GLUCOPHAGE) 500 mg tablet   Yes No   Sig: Take 500 mg by mouth 2 (two) times a day with meals Morning and evening   metoprolol tartrate (LOPRESSOR) 50 mg tablet  Self Yes No   Sig: Take 75 mg by mouth every 12 (twelve) hours   nitroglycerin (NITROSTAT) 0.3 mg SL tablet  Self No No   Sig: Place 1 tablet (0.3 mg total) under the tongue every 5 (five) minutes as needed for chest pain   omeprazole (PriLOSEC) 20 mg delayed release capsule   No No   Sig: Take 2 capsules (40 mg total) by mouth 2 (two) times a day for 20 days   quinapril (ACCUPRIL) 20 mg tablet  Self Yes No   Sig: Take 20 mg by mouth daily   rosuvastatin (CRESTOR) 10 MG tablet  Self Yes No   Sig: Take 10 mg by mouth daily      Facility-Administered Medications: None     Patient's Medications   Discharge Prescriptions    No medications on file     No discharge procedures on file.  ED SEPSIS DOCUMENTATION   Time reflects when diagnosis was documented in both MDM as applicable and the Disposition within this note       Time User Action Codes Description Comment    11/28/2024  2:22 PM Luther Jensen Add [K56.699,  W44.F3XA] Food bolus obstruction of intestine  (HCC)                  Luther Jensen MD  11/28/24 2654

## 2025-01-02 ENCOUNTER — TELEPHONE (OUTPATIENT)
Age: 80
End: 2025-01-02

## 2025-01-02 NOTE — TELEPHONE ENCOUNTER
Patient wife Leda, called in to cancel procedure with Dr. Hoover on 1/22/2025. Leda states patient does not want to proceed with procedure.

## 2025-06-13 ENCOUNTER — APPOINTMENT (OUTPATIENT)
Dept: LAB | Facility: CLINIC | Age: 80
End: 2025-06-13
Attending: INTERNAL MEDICINE
Payer: MEDICARE

## 2025-06-13 DIAGNOSIS — I25.10 ATHEROSCLEROSIS OF NATIVE CORONARY ARTERY, UNSPECIFIED WHETHER ANGINA PRESENT, UNSPECIFIED WHETHER NATIVE OR TRANSPLANTED HEART: ICD-10-CM

## 2025-06-13 DIAGNOSIS — Z86.73 PERSONAL HISTORY OF TRANSIENT CEREBRAL ISCHEMIA: ICD-10-CM

## 2025-06-13 DIAGNOSIS — E78.2 MIXED HYPERLIPIDEMIA: ICD-10-CM

## 2025-06-13 DIAGNOSIS — E11.49 OTHER DIABETIC NEUROLOGICAL COMPLICATION ASSOCIATED WITH TYPE 2 DIABETES MELLITUS (HCC): ICD-10-CM

## 2025-06-13 DIAGNOSIS — I10 ESSENTIAL HYPERTENSION, MALIGNANT: ICD-10-CM

## 2025-06-13 LAB
ALBUMIN SERPL BCG-MCNC: 4.2 G/DL (ref 3.5–5)
ALP SERPL-CCNC: 55 U/L (ref 34–104)
ALT SERPL W P-5'-P-CCNC: 16 U/L (ref 7–52)
ANION GAP SERPL CALCULATED.3IONS-SCNC: 6 MMOL/L (ref 4–13)
AST SERPL W P-5'-P-CCNC: 19 U/L (ref 13–39)
BILIRUB DIRECT SERPL-MCNC: 0.22 MG/DL (ref 0–0.2)
BILIRUB SERPL-MCNC: 0.98 MG/DL (ref 0.2–1)
BUN SERPL-MCNC: 16 MG/DL (ref 5–25)
CALCIUM SERPL-MCNC: 9.1 MG/DL (ref 8.4–10.2)
CHLORIDE SERPL-SCNC: 103 MMOL/L (ref 96–108)
CHOLEST SERPL-MCNC: 96 MG/DL (ref ?–200)
CO2 SERPL-SCNC: 29 MMOL/L (ref 21–32)
CREAT SERPL-MCNC: 0.68 MG/DL (ref 0.6–1.3)
ERYTHROCYTE [DISTWIDTH] IN BLOOD BY AUTOMATED COUNT: 12.9 % (ref 11.6–15.1)
EST. AVERAGE GLUCOSE BLD GHB EST-MCNC: 143 MG/DL
GFR SERPL CREATININE-BSD FRML MDRD: 91 ML/MIN/1.73SQ M
GLUCOSE P FAST SERPL-MCNC: 133 MG/DL (ref 65–99)
HBA1C MFR BLD: 6.6 %
HCT VFR BLD AUTO: 42.5 % (ref 36.5–49.3)
HDLC SERPL-MCNC: 39 MG/DL
HGB BLD-MCNC: 14.7 G/DL (ref 12–17)
LDLC SERPL CALC-MCNC: 39 MG/DL (ref 0–100)
MAGNESIUM SERPL-MCNC: 2 MG/DL (ref 1.9–2.7)
MCH RBC QN AUTO: 34.7 PG (ref 26.8–34.3)
MCHC RBC AUTO-ENTMCNC: 34.6 G/DL (ref 31.4–37.4)
MCV RBC AUTO: 100 FL (ref 82–98)
NONHDLC SERPL-MCNC: 57 MG/DL
PLATELET # BLD AUTO: 203 THOUSANDS/UL (ref 149–390)
PMV BLD AUTO: 10.6 FL (ref 8.9–12.7)
POTASSIUM SERPL-SCNC: 4 MMOL/L (ref 3.5–5.3)
PROT SERPL-MCNC: 7.5 G/DL (ref 6.4–8.4)
RBC # BLD AUTO: 4.24 MILLION/UL (ref 3.88–5.62)
SODIUM SERPL-SCNC: 138 MMOL/L (ref 135–147)
TRIGL SERPL-MCNC: 90 MG/DL (ref ?–150)
WBC # BLD AUTO: 16.48 THOUSAND/UL (ref 4.31–10.16)

## 2025-06-13 PROCEDURE — 85027 COMPLETE CBC AUTOMATED: CPT

## 2025-06-13 PROCEDURE — 80076 HEPATIC FUNCTION PANEL: CPT

## 2025-06-13 PROCEDURE — 80061 LIPID PANEL: CPT

## 2025-06-13 PROCEDURE — 80048 BASIC METABOLIC PNL TOTAL CA: CPT

## 2025-06-13 PROCEDURE — 83036 HEMOGLOBIN GLYCOSYLATED A1C: CPT

## 2025-06-13 PROCEDURE — 36415 COLL VENOUS BLD VENIPUNCTURE: CPT

## 2025-06-13 PROCEDURE — 83735 ASSAY OF MAGNESIUM: CPT

## (undated) DEVICE — SUT MONOCRYL 4-0 PS-2 18 IN Y496G

## (undated) DEVICE — SUT VICRYL 2-0 REEL 54 IN J286G

## (undated) DEVICE — 2000CC GUARDIAN II: Brand: GUARDIAN

## (undated) DEVICE — SCD SEQUENTIAL COMPRESSION COMFORT SLEEVE MEDIUM KNEE LENGTH: Brand: KENDALL SCD

## (undated) DEVICE — CHLORAPREP HI-LITE 26ML ORANGE

## (undated) DEVICE — SUT VICRYL 3-0 SH 27 IN J416H

## (undated) DEVICE — ADHESIVE SKN CLSR HISTOACRYL FLEX 0.5ML LF

## (undated) DEVICE — REM POLYHESIVE ADULT PATIENT RETURN ELECTRODE: Brand: VALLEYLAB

## (undated) DEVICE — 3000CC GUARDIAN II: Brand: GUARDIAN

## (undated) DEVICE — SUT VICRYL 1 CT-1 27 IN J261H

## (undated) DEVICE — VIOLET BRAIDED (POLYGLACTIN 910), SYNTHETIC ABSORBABLE SUTURE: Brand: COATED VICRYL

## (undated) DEVICE — PLUMEPEN PRO 10FT

## (undated) DEVICE — UNDYED BRAIDED (POLYGLACTIN 910), SYNTHETIC ABSORBABLE SUTURE: Brand: COATED VICRYL

## (undated) DEVICE — GLOVE SRG BIOGEL ECLIPSE 7

## (undated) DEVICE — INTENDED FOR TISSUE SEPARATION, AND OTHER PROCEDURES THAT REQUIRE A SHARP SURGICAL BLADE TO PUNCTURE OR CUT.: Brand: BARD-PARKER SAFETY BLADES SIZE 15, STERILE

## (undated) DEVICE — STRL UNIVERSAL MINOR GENERAL: Brand: CARDINAL HEALTH